# Patient Record
Sex: MALE | URBAN - METROPOLITAN AREA
[De-identification: names, ages, dates, MRNs, and addresses within clinical notes are randomized per-mention and may not be internally consistent; named-entity substitution may affect disease eponyms.]

---

## 2017-07-27 PROBLEM — Z72.0 CONTINUOUS TOBACCO ABUSE: Status: ACTIVE | Noted: 2017-07-27

## 2017-07-27 PROBLEM — K08.9 POOR DENTITION: Status: ACTIVE | Noted: 2017-07-27

## 2017-09-13 PROBLEM — Z83.79 FH: CELIAC DISEASE: Status: ACTIVE | Noted: 2017-09-13

## 2021-01-19 PROBLEM — H90.3 SENSORINEURAL HEARING LOSS (SNHL) OF BOTH EARS: Status: ACTIVE | Noted: 2021-01-19

## 2022-12-07 ENCOUNTER — APPOINTMENT (RX ONLY)
Dept: URBAN - METROPOLITAN AREA CLINIC 6 | Facility: CLINIC | Age: 62
Setting detail: DERMATOLOGY
End: 2022-12-07

## 2022-12-07 DIAGNOSIS — D485 NEOPLASM OF UNCERTAIN BEHAVIOR OF SKIN: ICD-10-CM

## 2022-12-07 DIAGNOSIS — D18.0 HEMANGIOMA: ICD-10-CM

## 2022-12-07 DIAGNOSIS — L57.0 ACTINIC KERATOSIS: ICD-10-CM

## 2022-12-07 DIAGNOSIS — Z71.89 OTHER SPECIFIED COUNSELING: ICD-10-CM

## 2022-12-07 DIAGNOSIS — D22 MELANOCYTIC NEVI: ICD-10-CM

## 2022-12-07 DIAGNOSIS — L81.4 OTHER MELANIN HYPERPIGMENTATION: ICD-10-CM

## 2022-12-07 DIAGNOSIS — L82.1 OTHER SEBORRHEIC KERATOSIS: ICD-10-CM

## 2022-12-07 PROBLEM — D48.5 NEOPLASM OF UNCERTAIN BEHAVIOR OF SKIN: Status: ACTIVE | Noted: 2022-12-07

## 2022-12-07 PROBLEM — D22.5 MELANOCYTIC NEVI OF TRUNK: Status: ACTIVE | Noted: 2022-12-07

## 2022-12-07 PROBLEM — D18.01 HEMANGIOMA OF SKIN AND SUBCUTANEOUS TISSUE: Status: ACTIVE | Noted: 2022-12-07

## 2022-12-07 PROCEDURE — ? COUNSELING

## 2022-12-07 PROCEDURE — 99203 OFFICE O/P NEW LOW 30 MIN: CPT | Mod: 25

## 2022-12-07 PROCEDURE — 11102 TANGNTL BX SKIN SINGLE LES: CPT

## 2022-12-07 PROCEDURE — ? BIOPSY BY SHAVE METHOD

## 2022-12-07 PROCEDURE — 17003 DESTRUCT PREMALG LES 2-14: CPT

## 2022-12-07 PROCEDURE — ? LIQUID NITROGEN

## 2022-12-07 PROCEDURE — 17000 DESTRUCT PREMALG LESION: CPT | Mod: 59

## 2022-12-07 PROCEDURE — 11103 TANGNTL BX SKIN EA SEP/ADDL: CPT

## 2022-12-07 ASSESSMENT — LOCATION DETAILED DESCRIPTION DERM
LOCATION DETAILED: RIGHT SUPERIOR FOREHEAD
LOCATION DETAILED: RIGHT CENTRAL TEMPLE
LOCATION DETAILED: RIGHT SUPERIOR CENTRAL MALAR CHEEK
LOCATION DETAILED: RIGHT CENTRAL MALAR CHEEK
LOCATION DETAILED: LEFT SUPERIOR CENTRAL MALAR CHEEK
LOCATION DETAILED: RIGHT SUPERIOR PARIETAL SCALP
LOCATION DETAILED: PERIUMBILICAL SKIN
LOCATION DETAILED: STERNUM
LOCATION DETAILED: EPIGASTRIC SKIN
LOCATION DETAILED: LEFT SUPERIOR HELIX
LOCATION DETAILED: RIGHT RADIAL DORSAL HAND
LOCATION DETAILED: LEFT ULNAR DORSAL HAND
LOCATION DETAILED: LEFT MEDIAL INFERIOR CHEST
LOCATION DETAILED: LEFT INFERIOR CENTRAL MALAR CHEEK

## 2022-12-07 ASSESSMENT — LOCATION SIMPLE DESCRIPTION DERM
LOCATION SIMPLE: ABDOMEN
LOCATION SIMPLE: RIGHT CHEEK
LOCATION SIMPLE: LEFT HAND
LOCATION SIMPLE: RIGHT FOREHEAD
LOCATION SIMPLE: SCALP
LOCATION SIMPLE: RIGHT TEMPLE
LOCATION SIMPLE: CHEST
LOCATION SIMPLE: LEFT CHEEK
LOCATION SIMPLE: RIGHT HAND
LOCATION SIMPLE: LEFT EAR

## 2022-12-07 ASSESSMENT — LOCATION ZONE DERM
LOCATION ZONE: HAND
LOCATION ZONE: TRUNK
LOCATION ZONE: SCALP
LOCATION ZONE: EAR
LOCATION ZONE: FACE

## 2022-12-07 NOTE — PROCEDURE: LIQUID NITROGEN
Show Applicator Variable?: Yes
Duration Of Freeze Thaw-Cycle (Seconds): 3
Application Tool (Optional): Cry-AC
Detail Level: Detailed
Consent: The patient's consent was obtained including but not limited to risks of crusting, scabbing, blistering, scarring, darker or lighter pigmentary change, recurrence, incomplete removal and infection.
Post-Care Instructions: I reviewed with the patient in detail post-care instructions. Patient is to wear sunprotection, and avoid picking at any of the treated lesions. Pt may apply Vaseline to crusted or scabbing areas.
Number Of Freeze-Thaw Cycles: 3 freeze-thaw cycles
Render Post-Care Instructions In Note?: no

## 2022-12-07 NOTE — PROCEDURE: BIOPSY BY SHAVE METHOD
Detail Level: Detailed
Depth Of Biopsy: dermis
Was A Bandage Applied: Yes
Size Of Lesion In Cm: 1.5
X Size Of Lesion In Cm: 0
Biopsy Type: H and E
Biopsy Method: Dermablade
Anesthesia Type: 1% lidocaine with epinephrine
Anesthesia Volume In Cc: 0.5
Hemostasis: Drysol
Wound Care: Petrolatum
Dressing: bandage
Destruction After The Procedure: No
Type Of Destruction Used: Curettage
Curettage Text: The wound bed was treated with curettage after the biopsy was performed.
Cryotherapy Text: The wound bed was treated with cryotherapy after the biopsy was performed.
Electrodesiccation Text: The wound bed was treated with electrodesiccation after the biopsy was performed.
Electrodesiccation And Curettage Text: The wound bed was treated with electrodesiccation and curettage after the biopsy was performed.
Silver Nitrate Text: The wound bed was treated with silver nitrate after the biopsy was performed.
Lab: [id number]
Lab Facility: 
Consent: Written consent was obtained and risks were reviewed including but not limited to scarring, infection, bleeding, scabbing, incomplete removal, nerve damage and allergy to anesthesia.
Post-Care Instructions: I reviewed with the patient in detail post-care instructions. Patient is to keep the biopsy site dry overnight, and then apply bacitracin twice daily until healed. Patient may apply hydrogen peroxide soaks to remove any crusting.
Notification Instructions: Patient will be notified of biopsy results. However, patient instructed to call the office if not contacted within 2 weeks.
Billing Type: Third-Party Bill
Information: Selecting Yes will display possible errors in your note based on the variables you have selected. This validation is only offered as a suggestion for you. PLEASE NOTE THAT THE VALIDATION TEXT WILL BE REMOVED WHEN YOU FINALIZE YOUR NOTE. IF YOU WANT TO FAX A PRELIMINARY NOTE YOU WILL NEED TO TOGGLE THIS TO 'NO' IF YOU DO NOT WANT IT IN YOUR FAXED NOTE.
Size Of Lesion In Cm: 0.4

## 2022-12-07 NOTE — PROCEDURE: MIPS QUALITY
Quality 402: Tobacco Use And Help With Quitting Among Adolescents: Patient screened for tobacco and is a smoker AND received Cessation Counseling
Quality 431: Preventive Care And Screening: Unhealthy Alcohol Use - Screening: Patient not identified as an unhealthy alcohol user when screened for unhealthy alcohol use using a systematic screening method
Detail Level: Detailed
Quality 226: Preventive Care And Screening: Tobacco Use: Screening And Cessation Intervention: Patient screened for tobacco use, is a smoker AND received Cessation Counseling within the Previous 12 Months

## 2023-10-14 ENCOUNTER — HOSPITAL ENCOUNTER (INPATIENT)
Facility: MEDICAL CENTER | Age: 63
LOS: 1 days | DRG: 064 | End: 2023-10-15
Attending: EMERGENCY MEDICINE | Admitting: STUDENT IN AN ORGANIZED HEALTH CARE EDUCATION/TRAINING PROGRAM
Payer: COMMERCIAL

## 2023-10-14 ENCOUNTER — APPOINTMENT (OUTPATIENT)
Dept: RADIOLOGY | Facility: MEDICAL CENTER | Age: 63
DRG: 064 | End: 2023-10-14
Attending: EMERGENCY MEDICINE
Payer: COMMERCIAL

## 2023-10-14 ENCOUNTER — HOSPITAL ENCOUNTER (OUTPATIENT)
Dept: RADIOLOGY | Facility: MEDICAL CENTER | Age: 63
End: 2023-10-14

## 2023-10-14 DIAGNOSIS — I60.9 SAH (SUBARACHNOID HEMORRHAGE) (HCC): ICD-10-CM

## 2023-10-14 DIAGNOSIS — I63.9 ACUTE STROKE DUE TO ISCHEMIA (HCC): Primary | ICD-10-CM

## 2023-10-14 DIAGNOSIS — I60.9 SUBARACHNOID HEMORRHAGE (HCC): ICD-10-CM

## 2023-10-14 DIAGNOSIS — F10.10 ALCOHOL USE DISORDER, MILD, ABUSE: ICD-10-CM

## 2023-10-14 DIAGNOSIS — F17.210 SMOKING GREATER THAN 30 PACK YEARS: ICD-10-CM

## 2023-10-14 PROBLEM — G93.6 CEREBRAL EDEMA (HCC): Status: ACTIVE | Noted: 2023-10-14

## 2023-10-14 PROBLEM — I63.532 ACUTE ISCHEMIC LEFT PCA STROKE (HCC): Status: ACTIVE | Noted: 2023-10-14

## 2023-10-14 LAB
ANION GAP SERPL CALC-SCNC: 12 MMOL/L (ref 7–16)
APTT PPP: 24.9 SEC (ref 24.7–36)
BASOPHILS # BLD AUTO: 1.3 % (ref 0–1.8)
BASOPHILS # BLD: 0.08 K/UL (ref 0–0.12)
BUN SERPL-MCNC: 11 MG/DL (ref 8–22)
CALCIUM SERPL-MCNC: 9.5 MG/DL (ref 8.5–10.5)
CFT BLD TEG: 5.2 MIN (ref 4.6–9.1)
CFT P HPASE BLD TEG: 5.7 MIN (ref 4.3–8.3)
CHLORIDE SERPL-SCNC: 107 MMOL/L (ref 96–112)
CLOT ANGLE BLD TEG: 71.2 DEGREES (ref 63–78)
CO2 SERPL-SCNC: 19 MMOL/L (ref 20–33)
CREAT SERPL-MCNC: 0.81 MG/DL (ref 0.5–1.4)
CT.EXTRINSIC BLD ROTEM: 1.4 MIN (ref 0.8–2.1)
EOSINOPHIL # BLD AUTO: 0.2 K/UL (ref 0–0.51)
EOSINOPHIL NFR BLD: 3.3 % (ref 0–6.9)
ERYTHROCYTE [DISTWIDTH] IN BLOOD BY AUTOMATED COUNT: 42.9 FL (ref 35.9–50)
EST. AVERAGE GLUCOSE BLD GHB EST-MCNC: 114 MG/DL
GFR SERPLBLD CREATININE-BSD FMLA CKD-EPI: 100 ML/MIN/1.73 M 2
GLUCOSE SERPL-MCNC: 95 MG/DL (ref 65–99)
HBA1C MFR BLD: 5.6 % (ref 4–5.6)
HCT VFR BLD AUTO: 48 % (ref 42–52)
HGB BLD-MCNC: 17.2 G/DL (ref 14–18)
IMM GRANULOCYTES # BLD AUTO: 0.01 K/UL (ref 0–0.11)
IMM GRANULOCYTES NFR BLD AUTO: 0.2 % (ref 0–0.9)
INR PPP: 1.06 (ref 0.87–1.13)
LYMPHOCYTES # BLD AUTO: 1.92 K/UL (ref 1–4.8)
LYMPHOCYTES NFR BLD: 31.3 % (ref 22–41)
MCF BLD TEG: 58 MM (ref 52–69)
MCF.PLATELET INHIB BLD ROTEM: 20.1 MM (ref 15–32)
MCH RBC QN AUTO: 34.7 PG (ref 27–33)
MCHC RBC AUTO-ENTMCNC: 35.8 G/DL (ref 32.3–36.5)
MCV RBC AUTO: 96.8 FL (ref 81.4–97.8)
MONOCYTES # BLD AUTO: 0.66 K/UL (ref 0–0.85)
MONOCYTES NFR BLD AUTO: 10.7 % (ref 0–13.4)
NEUTROPHILS # BLD AUTO: 3.27 K/UL (ref 1.82–7.42)
NEUTROPHILS NFR BLD: 53.2 % (ref 44–72)
NRBC # BLD AUTO: 0 K/UL
NRBC BLD-RTO: 0 /100 WBC (ref 0–0.2)
PA AA BLD-ACNC: 0 % (ref 0–11)
PA ADP BLD-ACNC: 0 % (ref 0–17)
PLATELET # BLD AUTO: 259 K/UL (ref 164–446)
PMV BLD AUTO: 9.3 FL (ref 9–12.9)
POTASSIUM SERPL-SCNC: 4.2 MMOL/L (ref 3.6–5.5)
PROTHROMBIN TIME: 13.9 SEC (ref 12–14.6)
RBC # BLD AUTO: 4.96 M/UL (ref 4.7–6.1)
SODIUM SERPL-SCNC: 138 MMOL/L (ref 135–145)
TEG ALGORITHM TGALG: NORMAL
WBC # BLD AUTO: 6.1 K/UL (ref 4.8–10.8)

## 2023-10-14 PROCEDURE — 36415 COLL VENOUS BLD VENIPUNCTURE: CPT

## 2023-10-14 PROCEDURE — 85025 COMPLETE CBC W/AUTO DIFF WBC: CPT

## 2023-10-14 PROCEDURE — 99223 1ST HOSP IP/OBS HIGH 75: CPT | Performed by: STUDENT IN AN ORGANIZED HEALTH CARE EDUCATION/TRAINING PROGRAM

## 2023-10-14 PROCEDURE — 85384 FIBRINOGEN ACTIVITY: CPT

## 2023-10-14 PROCEDURE — 85576 BLOOD PLATELET AGGREGATION: CPT | Mod: 91

## 2023-10-14 PROCEDURE — 770020 HCHG ROOM/CARE - TELE (206)

## 2023-10-14 PROCEDURE — 99406 BEHAV CHNG SMOKING 3-10 MIN: CPT

## 2023-10-14 PROCEDURE — 85347 COAGULATION TIME ACTIVATED: CPT

## 2023-10-14 PROCEDURE — 85610 PROTHROMBIN TIME: CPT

## 2023-10-14 PROCEDURE — 99222 1ST HOSP IP/OBS MODERATE 55: CPT | Performed by: PSYCHIATRY & NEUROLOGY

## 2023-10-14 PROCEDURE — 70496 CT ANGIOGRAPHY HEAD: CPT

## 2023-10-14 PROCEDURE — 80048 BASIC METABOLIC PNL TOTAL CA: CPT

## 2023-10-14 PROCEDURE — 99285 EMERGENCY DEPT VISIT HI MDM: CPT

## 2023-10-14 PROCEDURE — 83036 HEMOGLOBIN GLYCOSYLATED A1C: CPT

## 2023-10-14 PROCEDURE — 700117 HCHG RX CONTRAST REV CODE 255: Performed by: EMERGENCY MEDICINE

## 2023-10-14 PROCEDURE — 70498 CT ANGIOGRAPHY NECK: CPT

## 2023-10-14 PROCEDURE — 85730 THROMBOPLASTIN TIME PARTIAL: CPT

## 2023-10-14 RX ORDER — POLYETHYLENE GLYCOL 3350 17 G/17G
1 POWDER, FOR SOLUTION ORAL
Status: DISCONTINUED | OUTPATIENT
Start: 2023-10-14 | End: 2023-10-15 | Stop reason: HOSPADM

## 2023-10-14 RX ORDER — AMOXICILLIN 250 MG
2 CAPSULE ORAL 2 TIMES DAILY
Status: DISCONTINUED | OUTPATIENT
Start: 2023-10-14 | End: 2023-10-15 | Stop reason: HOSPADM

## 2023-10-14 RX ORDER — ACETAMINOPHEN 325 MG/1
650 TABLET ORAL EVERY 6 HOURS PRN
Status: DISCONTINUED | OUTPATIENT
Start: 2023-10-14 | End: 2023-10-15 | Stop reason: HOSPADM

## 2023-10-14 RX ORDER — BISACODYL 10 MG
10 SUPPOSITORY, RECTAL RECTAL
Status: DISCONTINUED | OUTPATIENT
Start: 2023-10-14 | End: 2023-10-15 | Stop reason: HOSPADM

## 2023-10-14 RX ADMIN — IOHEXOL 80 ML: 350 INJECTION, SOLUTION INTRAVENOUS at 16:59

## 2023-10-14 ASSESSMENT — PAIN DESCRIPTION - PAIN TYPE: TYPE: ACUTE PAIN

## 2023-10-14 ASSESSMENT — LIFESTYLE VARIABLES
TOTAL SCORE: 0
AVERAGE NUMBER OF DAYS PER WEEK YOU HAVE A DRINK CONTAINING ALCOHOL: 7
TOTAL SCORE: 0
DOES PATIENT WANT TO STOP DRINKING: NO
HOW MANY TIMES IN THE PAST YEAR HAVE YOU HAD 5 OR MORE DRINKS IN A DAY: 1
HAVE YOU EVER FELT YOU SHOULD CUT DOWN ON YOUR DRINKING: NO
EVER FELT BAD OR GUILTY ABOUT YOUR DRINKING: NO
ON A TYPICAL DAY WHEN YOU DRINK ALCOHOL HOW MANY DRINKS DO YOU HAVE: 4
CONSUMPTION TOTAL: POSITIVE
HAVE PEOPLE ANNOYED YOU BY CRITICIZING YOUR DRINKING: NO
SUBSTANCE_ABUSE: 1
ALCOHOL_USE: YES
EVER HAD A DRINK FIRST THING IN THE MORNING TO STEADY YOUR NERVES TO GET RID OF A HANGOVER: NO
TOTAL SCORE: 0

## 2023-10-14 ASSESSMENT — ENCOUNTER SYMPTOMS
NAUSEA: 0
MYALGIAS: 0
COUGH: 0
VOMITING: 0
FEVER: 0
ABDOMINAL PAIN: 0
NERVOUS/ANXIOUS: 0
SHORTNESS OF BREATH: 0
HEADACHES: 1
DIZZINESS: 0
CHILLS: 0
PALPITATIONS: 0
BLURRED VISION: 1

## 2023-10-14 ASSESSMENT — FIBROSIS 4 INDEX: FIB4 SCORE: 1.52

## 2023-10-14 NOTE — ED PROVIDER NOTES
"ED Provider Note    Scribed for Lewis Stephenson M.D. by Babita Henao. 10/14/2023  2:11 PM    Primary care provider: Shirley Hogue M.D.  Means of arrival: EMS    CHIEF COMPLAINT  Chief Complaint   Patient presents with    Sent by MD     Patient sent from [facility] for a hygroma and spontaneous SAH, patient reports feeling groggy and off on Tuesday, symptoms did not resolve, sought medical treatment today. -stroke scale,          HPI    Enid Plummer is a 62 y.o. male who presents to the Emergency Department for a hygroma with spontaneous subarachnoid hemorrhage onset prior to arrival. The patient reports he was sent from [facility] via EMS for this. The patient describes that four days ago he started to feel abnormal, and describes feeling \"groggy\", but ignored this. HE notes he was not able to remember names, or recall information easily. Wife confirmed he was not acting like his normal self. The feeling never resolved which is why he seeked medical treatment today. He notes drinking about 1 L of alcohol a day. He denies pertinent medical history. There are no known alleviating or exacerbating factors.  He denies difficulty with gait, numbness, or tingling. He rates his head pain as a 5/10.     HPI obtained directly from doctor: The patient denies head trauma. He patient denies being on blood thinners. The patient notes that he originally thought he was feeling this way because Monday night he was drinking more than normal. He notes Tuesday morning when he woke up he had abdominal pain, burry vision, nausea, and a headache, in addition to the brain fog. He then notes that with time this has improved he notes his vision is completely improved. He notes he even went back to work within the next days. Patient notes occasionally smoking marijuana. He notes a history of smoking cigarettes for 50 years. He denies any numbness, weakness, double vision, facial droop. He does note being more clumsy. He notes " his main concern right now is his mental status. He denies a history of alcohol withdrawal.    OUTSIDE HISTORIAN(S):  Wife confirmed he was not acting like his normal self.    EXTERNAL RECORDS REVIEWED  ED note from [facility] from today notes brain fog since Tuesday with no trauma. CT showed subarachnoid hemorrhage in left temporal regional with mass effect.   Office visit note December 2022 for scalp lesion.  No other past medical history.    REVIEW OF SYSTEMS  Pertinent positives include: head pain, SAH, brain fog. vision changes that has since improved  Pertinent negatives include: numbness, tingling, trouble with gait, double vision, facial droop.      PAST MEDICAL HISTORY  Patient has a reported medical history of scalp lesion in December 2022, no other pertinent medical history.     FAMILY HISTORY  Family History   Problem Relation Age of Onset    Parkinson's Disease Mother     Diabetes Daughter        SOCIAL HISTORY  Social History     Tobacco Use    Smoking status: Every Day     Current packs/day: 0.50     Average packs/day: 0.5 packs/day for 35.0 years (17.5 ttl pk-yrs)     Types: Cigarettes    Smokeless tobacco: Never   Vaping Use    Vaping Use: Never used   Substance Use Topics    Alcohol use: Yes     Alcohol/week: 10.5 oz     Types: 21 Standard drinks or equivalent per week    Drug use: No     Social History     Substance and Sexual Activity   Drug Use No       SURGICAL HISTORY  Past Surgical History:   Procedure Laterality Date    COLONOSCOPY - ENDO  10/31/2017    Procedure: COLONOSCOPY - ENDO;  Surgeon: Erasto Romano M.D.;  Location: [facility];  Service: Gastroenterology    SHOULDER ARTHROSCOPY W/ SLAP / LABRAL REPAIR  5/14/2013    Performed by Rodolfo Srivastava M.D. at SURGERY [facility]    OTHER ORTHOPEDIC SURGERY         CURRENT MEDICATIONS  No current facility-administered medications for this encounter.  No current outpatient medications     ALLERGIES  Allergies   Allergen  "Reactions    Augmentin Rash       PHYSICAL EXAM  VITAL SIGNS: BP (!) 142/64   Pulse 63   Temp 37.1 °C (98.8 °F) (Temporal)   Resp 20   Ht 1.727 m (5' 8\")   Wt 81.6 kg (180 lb)   SpO2 95%   BMI 27.37 kg/m²   Reviewed and mildly elevated blood pressure  Constitutional: Well developed, Well nourished.  HENT: Normocephalic, atraumatic, bilateral external ears normal, No intraoral erythema, edema, exudate  Eyes: PERRLA, conjunctiva pink, no scleral icterus.   Cardiovascular: Regular rate and rhythm. No murmurs, rubs or gallops.  No dependent edema or calf tenderness  Respiratory: Lungs clear to auscultation bilaterally. No wheezes, rales, or rhonchi.  Abdominal:  Abdomen soft, non-tender, non distended. No rebound, or guarding.    Skin: No erythema, no rash. No wounds or bruising.  Genitourinary: No costovertebral angle tenderness.   Musculoskeletal: no deformities.   Neurologic: LOC: Normal.      Motor left leg: No drift.  LOC questions; answers correctly.     Motor right leg: No drift.  Commands: Follows.     Limb ataxia: None.  Best gaze: Normal.      Sensation: Intact to light touch 4 limbs.  Visual fields: Intact by quadrants.     Language: Fluent.  Facial palsy: None.     Dysarthria: Normal.  Motor left arm: No drift.     Extinction: Normal.  Motor right arm: No drift.     Score: 0.    Psychiatric: Affect normal, Judgment normal, Mood normal.     LABS Ordered and Reviewed by Me:  Results for orders placed or performed during the hospital encounter of 10/14/23   CBC WITH DIFFERENTIAL   Result Value Ref Range    WBC 6.1 4.8 - 10.8 K/uL    RBC 4.96 4.70 - 6.10 M/uL    Hemoglobin 17.2 14.0 - 18.0 g/dL    Hematocrit 48.0 42.0 - 52.0 %    MCV 96.8 81.4 - 97.8 fL    MCH 34.7 (H) 27.0 - 33.0 pg    MCHC 35.8 32.3 - 36.5 g/dL    RDW 42.9 35.9 - 50.0 fL    Platelet Count 259 164 - 446 K/uL    MPV 9.3 9.0 - 12.9 fL    Neutrophils-Polys 53.20 44.00 - 72.00 %    Lymphocytes 31.30 22.00 - 41.00 %    Monocytes 10.70 0.00 - " 13.40 %    Eosinophils 3.30 0.00 - 6.90 %    Basophils 1.30 0.00 - 1.80 %    Immature Granulocytes 0.20 0.00 - 0.90 %    Nucleated RBC 0.00 0.00 - 0.20 /100 WBC    Neutrophils (Absolute) 3.27 1.82 - 7.42 K/uL    Lymphs (Absolute) 1.92 1.00 - 4.80 K/uL    Monos (Absolute) 0.66 0.00 - 0.85 K/uL    Eos (Absolute) 0.20 0.00 - 0.51 K/uL    Baso (Absolute) 0.08 0.00 - 0.12 K/uL    Immature Granulocytes (abs) 0.01 0.00 - 0.11 K/uL    NRBC (Absolute) 0.00 K/uL   Basic Metabolic Panel   Result Value Ref Range    Sodium 138 135 - 145 mmol/L    Potassium 4.2 3.6 - 5.5 mmol/L    Chloride 107 96 - 112 mmol/L    Co2 19 (L) 20 - 33 mmol/L    Glucose 95 65 - 99 mg/dL    Bun 11 8 - 22 mg/dL    Creatinine 0.81 0.50 - 1.40 mg/dL    Calcium 9.5 8.5 - 10.5 mg/dL    Anion Gap 12.0 7.0 - 16.0   APTT   Result Value Ref Range    APTT 24.9 24.7 - 36.0 sec   Prothrombin Time   Result Value Ref Range    PT 13.9 12.0 - 14.6 sec    INR 1.06 0.87 - 1.13   PLATELET MAPPING WITH BASIC TEG   Result Value Ref Range    Reaction Time Initial-R 5.2 4.6 - 9.1 min    React Time Initial Hep 5.7 4.3 - 8.3 min    Clot Kinetics-K 1.4 0.8 - 2.1 min    Clot Angle-Angle 71.2 63.0 - 78.0 degrees    Maximum Clot Strength-MA 58.0 52.0 - 69.0 mm    TEG Functional Fibrinogen(MA) 20.1 15.0 - 32.0 mm    % Inhibition ADP 0.0 0.0 - 17.0 %    % Inhibition AA 0.0 0.0 - 11.0 %    TEG Algorithm Link Algorithm    ESTIMATED GFR   Result Value Ref Range    GFR (CKD-EPI) 100 >60 mL/min/1.73 m 2     ED COURSE:  2:11 PM - Patient seen and examined at bedside. Patient is a 62 y.o. male who presents for evaluation of spontaneous SAH associated with brain fog. Exam reveals Alert & oriented x 2, cranial nerves 2-12 intact . Strength equal in extremities. Able to Finger to nose. Denies numbness or tingling. No focal deficit noted. Discussed with patient and/or family that he will be admitted for further plan of care and laboratory studies will be repeated. CBC w/ diff, BMP, APTT,  prothrombin time, platelet mapping ordered. Patient and/or family agrees to the plan of care.       INTERVENTIONS BY ME:    2:33 PM Paged Neurosurgery.      2:49 PM Paged intensivist.     I have discussed management of the patient with the following physicians and sources:    2:33 PM I discussed the patient's case and the above findings with Dr. Almeida (Neurosurgery) who would like a CT and CTA of the head. Ordered CT-Head and CT-CTA head at this time.     2:49 PM I discussed the patient's case and the above findings with Dr. Almeida (Neurosurgery) who notes Dr. Villarreal (Neurology) knows about the patient and would like me to consult him. Paged Dr. Villarreal.     2:56 PM I discussed the patient's case and the above findings with Dr. Colvin (Pulmonary Medicine) who will be admitting the patient and agrees to consult Dr. Villarreal.     3:02 PM I discussed the patient's case and the above findings with Dr. Villarreal (Neurologist) who CTA head and neck and admit to hospitlaist floor. Plan to admit for stroke work up, MRI. Ordered CT-CTA neck. Informed Dr. Colvin of this plan.    3:40 PM I discussed the patient's case and the above findings with Dr. Rubalcava (Internal Medicine) who will admit the patient.     MEDICAL DECISION MAKING:  PROBLEMS EVALUATED THIS VISIT:    Subarachnoid hemorrhage    Patient transferred from [place] with evidence of SAH on CT head. Only complaints is fogginess and decreased mental acuity including forgetting names. No focal deficits on exam. Blood pressure well controlled at blood pressure 140/60s. Was admitted for further evaluation by neurology and neurosurgery including MRI brain, CTA head and neck. Admitted to medicine service.    RISK:       PLAN:    Patient will be admitted to Dr. Rubalcava (Internal Medicine)  in guarded condition for CTA head and neck MRI brain PT and OT eval and neuros consultation.    CONDITION: fine.     FINAL IMPRESSION  1. SAH (subarachnoid hemorrhage) (HCC)        Babita GUPTA (Scribe),  am scribing for, and in the presence of, Lewis Stephenson M.D..    Electronically signed by: Babita Henao (Scribe), 10/14/2023    Sally Haji D.O., PGY-2 internal medicine consult, 10/14/2023 3:24 PM    I, Lewis Stephenson M.D. personally performed the services described in this documentation, as scribed by Babita Henao in my presence, and it is both accurate and complete.    I independently evaluated the patient and repeated the important components of the history and physical. I discussed the management with the resident. I have reviewed and agree with the pertinent clinical information above including history, exam, study findings, and recommendations.  I discussed management with resident Dr. Haji.  This patient is a transfer for subarachnoid hemorrhage but more likely is a stroke with some hemorrhagic conversion.  He will require further stroke work-up.  There is no history of trauma.  Fortunately his NIH score is currently 0.    Electronically signed by: Lewis Stephenson M.D., 10/14/2023 6:08 PM       The note accurately reflects work and decisions made by me.  Lewis Stephenson M.D.  10/14/2023  6:08 PM

## 2023-10-14 NOTE — ED NOTES
Med rec updated and complete. Allergies reviewed.   Confirmed name and date of birth.  Pt DENIES taking any medications.    Home pharmacy  [facility] = [phone]

## 2023-10-14 NOTE — PROGRESS NOTES
Dr Harris ER called for recommendations  Will defer to Dr Villarreal with neurology who accepted patient in transfer

## 2023-10-14 NOTE — ED TRIAGE NOTES
"Chief Complaint   Patient presents with    Sent by MD     Patient sent from [facility] for a hygroma and spontaneous SAH, patient reports feeling groggy and off on Tuesday, symptoms did not resolve, sought medical treatment today. -stroke scale,      BP (!) 142/64   Pulse 63   Temp 37.1 °C (98.8 °F) (Temporal)   Resp 20   Ht 1.727 m (5' 8\")   Wt 81.6 kg (180 lb)   SpO2 95%     "

## 2023-10-14 NOTE — ED NOTES
Bedside report given to: Hortensia RN, removed self from care of patient.  POC discussed with patient. Call light within reach, all needs addressed at this time.       Fall risk interventions in place: Move the patient closer to the nurse's station, Patient's personal possessions are with in their safe reach, Place socks on patient, Place fall risk sign on patient's door, Give patient urinal if applicable, Keep floor surfaces clean and dry, and Accompanied to restroom (all applicable per [place] Fall risk assessment)   Continuous monitoring: Cardiac Leads, Pulse Ox, or Blood Pressure  IVF/IV medications: Not Applicable   Oxygen: Room Air  Bedside sitter: Not Applicable   Isolation: Not Applicable

## 2023-10-14 NOTE — ED NOTES
Patient changed into hospital gown, secondary piv access established, blood drawn and sent to lab, placed on continuous cardiac monitoring, patient repetitive in conversation, disoriented to time, responds October 2021 when asked month and year, patient either unwilling to say or does not know current president. Bilateral  strengths equal, no slurred speech or facial droop.

## 2023-10-14 NOTE — H&P
Hospital Medicine History & Physical Note    Date of Service  10/14/2023    Primary Care Physician  Shirley Hogue M.D.    Consultants  neurology    Specialist Names: Dr. Villarreal    Code Status  Full Code    Chief Complaint  Chief Complaint   Patient presents with    Sent by MD     Patient sent from [facility] for a hygroma and spontaneous SAH, patient reports feeling groggy and off on Tuesday, symptoms did not resolve, sought medical treatment today. -stroke scale,        History of Presenting Illness  Enid Plummer is a 62 y.o. male who presented 10/14/2023 with brain fogginess.  This is a pleasant gentleman with a long history of smoking, who was transferred from [facility] for stroke evaluation.  He presented there with complaints of brain fogginess and headaches as well as some vision changes in his right eye that started approximately 4 days ago.  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  He does drink alcohol on a regular basis shots of bourbon.  He describes a sudden onset of the headache associated with nausea as well as up to stomach.  Evaluation at the outside hospital with a head CT was concerning for a hygroma with spontaneous subarachnoid hemorrhage.  However, per the neurologist read here, was concerning for a left PCA stroke involving the left medial temporal lobe.  Concern for mild hemorrhagic transformation.  Patient reports that he does not take any medications.    I discussed the plan of care with patient, family, and ER physician, Dr. Stephenson .  I discussed the case with the neurologist, Dr. Villarreal.    Review of Systems  Review of Systems   Constitutional:  Negative for chills and fever.   Eyes:  Positive for blurred vision.   Respiratory:  Negative for cough and shortness of breath.    Cardiovascular:  Negative for chest pain and palpitations.   Gastrointestinal:  Negative for abdominal pain, nausea and vomiting.   Genitourinary:  Negative for  dysuria and hematuria.   Musculoskeletal:  Negative for joint pain and myalgias.   Neurological:  Positive for headaches. Negative for dizziness.   Psychiatric/Behavioral:  Positive for substance abuse (Marijuana and alcohol). The patient is not nervous/anxious.        Past Medical History   has a past medical history of Acute stroke due to ischemia (HCC) (10/14/2023), Continuous tobacco abuse (7/27/2017), FH: celiac disease (9/13/2017), and Poor dentition (7/27/2017).    Surgical History   has a past surgical history that includes other orthopedic surgery; shoulder arthroscopy w/ slap / labral repair (5/14/2013); and colonoscopy - endo (10/31/2017).     Family History  family history includes Diabetes in his daughter; Parkinson's Disease in his mother.  Father had stroke.  Family history reviewed with patient. There is family history that is pertinent to the chief complaint.     Social History   reports that he has been smoking cigarettes. He has a 17.5 pack-year smoking history. He has never used smokeless tobacco. He reports current alcohol use of about 10.5 oz of alcohol per week. He reports that he does not use drugs.    Allergies  Allergies   Allergen Reactions    Amoxicillin-Pot Clavulanate Rash     > 20 years ago       Medications  None       Physical Exam  Temp:  [36.9 °C (98.4 °F)-37.1 °C (98.8 °F)] 37.1 °C (98.8 °F)  Pulse:  [56-85] 63  Resp:  [16-20] 20  BP: (125-142)/(64-89) 142/64  SpO2:  [93 %-96 %] 95 %  Blood Pressure: (Abnormal) 142/64   Temperature: 37.1 °C (98.8 °F)   Pulse: 63   Respiration: 20   Pulse Oximetry: 95 %       Physical Exam  Vitals and nursing note reviewed.   Constitutional:       General: He is not in acute distress.     Appearance: Normal appearance. He is well-developed. He is not diaphoretic.   HENT:      Head: Normocephalic and atraumatic.      Right Ear: External ear normal.      Left Ear: External ear normal.      Nose: Nose normal.      Mouth/Throat:      Pharynx:  Oropharynx is clear. No oropharyngeal exudate or posterior oropharyngeal erythema.   Eyes:      General: No scleral icterus.        Right eye: No discharge.         Left eye: No discharge.      Conjunctiva/sclera: Conjunctivae normal.      Pupils: Pupils are equal, round, and reactive to light.   Neck:      Thyroid: No thyromegaly.      Vascular: No JVD.      Trachea: No tracheal deviation.   Cardiovascular:      Rate and Rhythm: Normal rate and regular rhythm.      Heart sounds: Normal heart sounds. No murmur heard.     No friction rub. No gallop.   Pulmonary:      Effort: Pulmonary effort is normal. No respiratory distress.      Breath sounds: Normal breath sounds. No stridor. No wheezing or rales.   Abdominal:      General: Bowel sounds are normal. There is no distension.      Palpations: Abdomen is soft. There is no mass.      Tenderness: There is no abdominal tenderness. There is no guarding or rebound.   Musculoskeletal:         General: No tenderness or deformity.      Cervical back: Neck supple. No tenderness.      Right lower leg: No edema.      Left lower leg: No edema.   Lymphadenopathy:      Cervical: No cervical adenopathy.   Skin:     General: Skin is warm and dry.      Coloration: Skin is not pale.      Findings: No erythema or rash.   Neurological:      Mental Status: He is alert and oriented to person, place, and time.      Sensory: No sensory deficit.      Motor: No weakness or abnormal muscle tone.      Comments: No peritoneal drift.  Motor strength full in upper and lower extremities.  Cranial nerves II to XII are intact.  Vision full to confrontation.   Psychiatric:         Behavior: Behavior normal.         Thought Content: Thought content normal.         Judgment: Judgment normal.         Laboratory:  Recent Labs     10/14/23  1050 10/14/23  1407   WBC 5.3 6.1   RBC 4.85 4.96   HEMOGLOBIN 16.7 17.2   HEMATOCRIT 47.5 48.0   MCV 97.9* 96.8   MCH 34.4* 34.7*   MCHC 35.2 35.8   RDW 12.0 42.9  "  PLATELETCT 249 259   MPV 9.2 9.3     Recent Labs     10/14/23  1050 10/14/23  1407   SODIUM 139 138   POTASSIUM 4.2 4.2   CHLORIDE 111* 107   CO2 17* 19*   GLUCOSE 109* 95   BUN 13 11   CREATININE 0.8 0.81   CALCIUM 9.3 9.5     Recent Labs     10/14/23  1050 10/14/23  1407   ALTSGPT 32  --    ASTSGOT 35  --    ALKPHOSPHAT 55  --    TBILIRUBIN 1.2  --    GLUCOSE 109* 95     Recent Labs     10/14/23  1407   APTT 24.9   INR 1.06     No results for input(s): \"NTPROBNP\" in the last 72 hours.      No results for input(s): \"TROPONINT\" in the last 72 hours.    Imaging:  CT-CTA NECK WITH & W/O-POST PROCESSING   Final Result      No high-grade stenosis, large vessel occlusion, aneurysm or dissection.      CT-CTA HEAD WITH & W/O-POST PROCESS   Final Result      1.  Findings consistent with acute on chronic LEFT temporal occipital infarct with hemorrhagic transformation.  Small amount of subarachnoid hemorrhage within LEFT occipital sulci as seen on outside CT..   2.  Chronic LEFT thalamic infarct.   3.  No intracranial aneurysm, focal stenosis or abrupt large vessel cut off.      MR-BRAIN-W/O    (Results Pending)   EC-ECHOCARDIOGRAM COMPLETE W/O CONT    (Results Pending)     CT per my read shows a hypodensity within the left temporal occipital lobe.      CT angiogram of the head and neck per my read shows no large vessel occlusion.  No vascular malformations.      Assessment/Plan:  Justification for Admission Status  I anticipate this patient will require at least two midnights for appropriate medical management, necessitating inpatient admission because patient presents with findings on CT scan concerning for stroke.  He will need continuous cardiac monitoring with MRI as well as an echocardiogram for further work-up.    Patient will need a Telemetry bed on NEUROLOGY service .  The need is secondary to subacute stroke.    * Acute ischemic left PCA stroke (HCC)- (present on admission)  Assessment & Plan  Per CT angiogram " acute on chronic with subarachnoid hemorrhage.    Admit to neurology floor inpatient status  Neurochecks every 4 hours  Avoid antiplatelet therapy for now  Continuous cardiac monitoring  Echocardiogram to assess for PFO and intraventricular thrombus  MRI of the brain without contrast    Discussed with Dr. Villarreal, neurology    Subarachnoid hemorrhage (HCC)- (present on admission)  Assessment & Plan  Mild subarachnoid hemorrhage likely due to hemorrhagic transformation of acute on chronic left PCA stroke.    Avoid anticoagulation or antiplatelet therapy for now  MRI of the brain pending  Neurochecks every 4 hours    Alcohol use disorder, mild, abuse- (present on admission)  Assessment & Plan  Counseled decreasing alcohol intake.  No history of alcohol withdrawal.    Cerebral edema (HCC)- (present on admission)  Assessment & Plan  Due to acute on chronic stroke.    Neurochecks every 4 hours  Monitor closely  MRI of the brain without contrast    Smoking greater than 30 pack years- (present on admission)  Assessment & Plan  Continues to smoke half a pack per day.    I counseled the patient for 4 minutes regarding smoking cessation using methods such as nicotine replacement therapy with patches and gum and medications such as Wellbutrin.  I also discussed with him breathing techniques and meditation as well as regular physical activity, which will assist him with smoking cessation.  Patient Is not interested in quitting at this time.            VTE prophylaxis: SCDs/TEDs and pharmacologic prophylaxis contraindicated due to hemorrhagic transformation on CT

## 2023-10-15 ENCOUNTER — APPOINTMENT (OUTPATIENT)
Dept: CARDIOLOGY | Facility: MEDICAL CENTER | Age: 63
DRG: 064 | End: 2023-10-15
Attending: STUDENT IN AN ORGANIZED HEALTH CARE EDUCATION/TRAINING PROGRAM
Payer: COMMERCIAL

## 2023-10-15 ENCOUNTER — PHARMACY VISIT (OUTPATIENT)
Dept: PHARMACY | Facility: MEDICAL CENTER | Age: 63
End: 2023-10-15
Payer: COMMERCIAL

## 2023-10-15 ENCOUNTER — APPOINTMENT (OUTPATIENT)
Dept: RADIOLOGY | Facility: MEDICAL CENTER | Age: 63
DRG: 064 | End: 2023-10-15
Attending: STUDENT IN AN ORGANIZED HEALTH CARE EDUCATION/TRAINING PROGRAM
Payer: COMMERCIAL

## 2023-10-15 VITALS
OXYGEN SATURATION: 98 % | DIASTOLIC BLOOD PRESSURE: 67 MMHG | HEIGHT: 68 IN | TEMPERATURE: 97.7 F | SYSTOLIC BLOOD PRESSURE: 117 MMHG | BODY MASS INDEX: 27 KG/M2 | RESPIRATION RATE: 17 BRPM | WEIGHT: 178.13 LBS | HEART RATE: 60 BPM

## 2023-10-15 LAB
ALBUMIN SERPL BCP-MCNC: 3.8 G/DL (ref 3.2–4.9)
ALBUMIN/GLOB SERPL: 1.7 G/DL
ALP SERPL-CCNC: 55 U/L (ref 30–99)
ALT SERPL-CCNC: 19 U/L (ref 2–50)
ANION GAP SERPL CALC-SCNC: 10 MMOL/L (ref 7–16)
AST SERPL-CCNC: 16 U/L (ref 12–45)
BASOPHILS # BLD AUTO: 1.5 % (ref 0–1.8)
BASOPHILS # BLD: 0.1 K/UL (ref 0–0.12)
BILIRUB SERPL-MCNC: 0.5 MG/DL (ref 0.1–1.5)
BUN SERPL-MCNC: 17 MG/DL (ref 8–22)
CALCIUM ALBUM COR SERPL-MCNC: 9.3 MG/DL (ref 8.5–10.5)
CALCIUM SERPL-MCNC: 9.1 MG/DL (ref 8.5–10.5)
CHLORIDE SERPL-SCNC: 105 MMOL/L (ref 96–112)
CHOLEST SERPL-MCNC: 194 MG/DL (ref 100–199)
CO2 SERPL-SCNC: 22 MMOL/L (ref 20–33)
CREAT SERPL-MCNC: 0.98 MG/DL (ref 0.5–1.4)
EOSINOPHIL # BLD AUTO: 0.35 K/UL (ref 0–0.51)
EOSINOPHIL NFR BLD: 5.3 % (ref 0–6.9)
ERYTHROCYTE [DISTWIDTH] IN BLOOD BY AUTOMATED COUNT: 44 FL (ref 35.9–50)
GFR SERPLBLD CREATININE-BSD FMLA CKD-EPI: 87 ML/MIN/1.73 M 2
GLOBULIN SER CALC-MCNC: 2.2 G/DL (ref 1.9–3.5)
GLUCOSE SERPL-MCNC: 116 MG/DL (ref 65–99)
HCT VFR BLD AUTO: 43.6 % (ref 42–52)
HDLC SERPL-MCNC: 44 MG/DL
HGB BLD-MCNC: 15.1 G/DL (ref 14–18)
IMM GRANULOCYTES # BLD AUTO: 0.02 K/UL (ref 0–0.11)
IMM GRANULOCYTES NFR BLD AUTO: 0.3 % (ref 0–0.9)
LDLC SERPL CALC-MCNC: 131 MG/DL
LV EJECT FRACT  99904: 60
LYMPHOCYTES # BLD AUTO: 2.08 K/UL (ref 1–4.8)
LYMPHOCYTES NFR BLD: 31.4 % (ref 22–41)
MAGNESIUM SERPL-MCNC: 2.2 MG/DL (ref 1.5–2.5)
MCH RBC QN AUTO: 34.2 PG (ref 27–33)
MCHC RBC AUTO-ENTMCNC: 34.6 G/DL (ref 32.3–36.5)
MCV RBC AUTO: 98.9 FL (ref 81.4–97.8)
MONOCYTES # BLD AUTO: 0.74 K/UL (ref 0–0.85)
MONOCYTES NFR BLD AUTO: 11.2 % (ref 0–13.4)
NEUTROPHILS # BLD AUTO: 3.33 K/UL (ref 1.82–7.42)
NEUTROPHILS NFR BLD: 50.3 % (ref 44–72)
NRBC # BLD AUTO: 0 K/UL
NRBC BLD-RTO: 0 /100 WBC (ref 0–0.2)
PLATELET # BLD AUTO: 239 K/UL (ref 164–446)
PMV BLD AUTO: 9.5 FL (ref 9–12.9)
POTASSIUM SERPL-SCNC: 4.2 MMOL/L (ref 3.6–5.5)
PROT SERPL-MCNC: 6 G/DL (ref 6–8.2)
RBC # BLD AUTO: 4.41 M/UL (ref 4.7–6.1)
SODIUM SERPL-SCNC: 137 MMOL/L (ref 135–145)
TRIGL SERPL-MCNC: 94 MG/DL (ref 0–149)
WBC # BLD AUTO: 6.6 K/UL (ref 4.8–10.8)

## 2023-10-15 PROCEDURE — RXMED WILLOW AMBULATORY MEDICATION CHARGE

## 2023-10-15 PROCEDURE — 92523 SPEECH SOUND LANG COMPREHEN: CPT

## 2023-10-15 PROCEDURE — 80061 LIPID PANEL: CPT

## 2023-10-15 PROCEDURE — 99239 HOSP IP/OBS DSCHRG MGMT >30: CPT | Mod: GC | Performed by: STUDENT IN AN ORGANIZED HEALTH CARE EDUCATION/TRAINING PROGRAM

## 2023-10-15 PROCEDURE — 92610 EVALUATE SWALLOWING FUNCTION: CPT

## 2023-10-15 PROCEDURE — 70551 MRI BRAIN STEM W/O DYE: CPT

## 2023-10-15 PROCEDURE — 80053 COMPREHEN METABOLIC PANEL: CPT

## 2023-10-15 PROCEDURE — 85025 COMPLETE CBC W/AUTO DIFF WBC: CPT

## 2023-10-15 PROCEDURE — 700102 HCHG RX REV CODE 250 W/ 637 OVERRIDE(OP)

## 2023-10-15 PROCEDURE — 93306 TTE W/DOPPLER COMPLETE: CPT

## 2023-10-15 PROCEDURE — 93306 TTE W/DOPPLER COMPLETE: CPT | Mod: 26 | Performed by: INTERNAL MEDICINE

## 2023-10-15 PROCEDURE — A9270 NON-COVERED ITEM OR SERVICE: HCPCS

## 2023-10-15 PROCEDURE — 36415 COLL VENOUS BLD VENIPUNCTURE: CPT

## 2023-10-15 PROCEDURE — 99233 SBSQ HOSP IP/OBS HIGH 50: CPT | Performed by: PSYCHIATRY & NEUROLOGY

## 2023-10-15 PROCEDURE — 83735 ASSAY OF MAGNESIUM: CPT

## 2023-10-15 PROCEDURE — 97162 PT EVAL MOD COMPLEX 30 MIN: CPT

## 2023-10-15 RX ORDER — ASPIRIN 81 MG/1
81 TABLET ORAL DAILY
Status: DISCONTINUED | OUTPATIENT
Start: 2023-10-15 | End: 2023-10-15 | Stop reason: HOSPADM

## 2023-10-15 RX ORDER — ASPIRIN 81 MG/1
81 TABLET ORAL DAILY
Qty: 90 TABLET | Refills: 0 | Status: SHIPPED | OUTPATIENT
Start: 2023-10-15 | End: 2024-01-13

## 2023-10-15 RX ORDER — ATORVASTATIN CALCIUM 80 MG/1
80 TABLET, FILM COATED ORAL DAILY
Qty: 90 TABLET | Refills: 0 | Status: SHIPPED | OUTPATIENT
Start: 2023-10-16 | End: 2023-11-07 | Stop reason: SDUPTHER

## 2023-10-15 RX ORDER — ATORVASTATIN CALCIUM 80 MG/1
80 TABLET, FILM COATED ORAL DAILY
Status: DISCONTINUED | OUTPATIENT
Start: 2023-10-16 | End: 2023-10-15 | Stop reason: HOSPADM

## 2023-10-15 RX ORDER — ATORVASTATIN CALCIUM 40 MG/1
40 TABLET, FILM COATED ORAL DAILY
Qty: 30 TABLET | Refills: 0 | Status: SHIPPED | OUTPATIENT
Start: 2023-10-15 | End: 2023-10-15

## 2023-10-15 RX ORDER — ATORVASTATIN CALCIUM 40 MG/1
40 TABLET, FILM COATED ORAL DAILY
Status: DISCONTINUED | OUTPATIENT
Start: 2023-10-15 | End: 2023-10-15

## 2023-10-15 RX ADMIN — ATORVASTATIN CALCIUM 40 MG: 40 TABLET, FILM COATED ORAL at 09:23

## 2023-10-15 ASSESSMENT — FIBROSIS 4 INDEX: FIB4 SCORE: 0.94

## 2023-10-15 ASSESSMENT — COGNITIVE AND FUNCTIONAL STATUS - GENERAL
SUGGESTED CMS G CODE MODIFIER MOBILITY: CH
MOBILITY SCORE: 24

## 2023-10-15 ASSESSMENT — GAIT ASSESSMENTS
GAIT LEVEL OF ASSIST: SUPERVISED
DISTANCE (FEET): 200
DEVIATION: NO DEVIATION

## 2023-10-15 ASSESSMENT — PAIN DESCRIPTION - PAIN TYPE: TYPE: ACUTE PAIN

## 2023-10-15 NOTE — ASSESSMENT & PLAN NOTE
Per CT angiogram acute on chronic with subarachnoid hemorrhage.    Admit to neurology floor inpatient status  Neurochecks every 4 hours  Avoid antiplatelet therapy for now  Continuous cardiac monitoring  Echocardiogram to assess for PFO and intraventricular thrombus  MRI of the brain without contrast    Discussed with Dr. Villarreal, neurology

## 2023-10-15 NOTE — THERAPY
Physical Therapy   Initial Evaluation     Patient Name: Enid Plummer  Age:  62 y.o., Sex:  male  Medical Record #: [id number]  Today's Date: 10/15/2023     Precautions  Precautions: Fall Risk;Swallow Precautions;Other (See Comments)  Comments: BP goal: 110-130/60-80    Assessment  Patient is 62 y.o. male who presented 10/14/2023 with brain fogginess, headaches, vision changes in his right eye that started approximately 4 days ago. Was transferred from  for stroke evaluation.    Found to have acute ischemic L PCA stroke, SAH, cerebral edema  PMH: Continuous tobacco abuse, alcohol use disorder.    Patient seen for PT evaluation. In bed, agreeable to participate. Was able to demonstrate functional mobility tasks as detailed below. Patient appears to be close to his baseline in terms of functional mobility. Does not need any further PT services at this time.   Reports brain fog still persistent.     Recommend OOB to chair for meals and ambulate in the hallway multiple times during the day, with supervision from nursing/family.     Plan    Physical Therapy Initial Treatment Plan   Duration: Evaluation only    DC Equipment Recommendations: None  Discharge Recommendations: Anticipate that the patient will have no further physical therapy needs after discharge from the hospital     Objective       10/15/23 1026   Charge Group   PT Evaluation PT Evaluation Mod   Total Time Spent   PT Evaluation Time Spent (Mins) 20   PT Total Time Spent (Calculated) 20   Initial Contact Note    Initial Contact Note Order Received and Verified, Physical Therapy Evaluation in Progress with Full Report to Follow.   Precautions   Precautions Fall Risk;Swallow Precautions;Other (See Comments)   Comments BP goal: 110-130/60-80   Vitals   Pulse 60   Patient BP Position Sitting  (Edge of bed, post activity)   Blood Pressure 117/67   Pulse Oximetry 98 %   O2 Delivery Device None - Room Air   Pain   Pain Scales 0 to 10 Scale     Intervention Declines   Pain 0 - 10 Group   Therapist Pain Assessment Prior to Activity;During Activity;Post Activity;0   Prior Living Situation   Prior Services Home-Independent   Housing / Facility 1 Story House   Steps Into Home 15   Steps In Home 2   Rail None   Equipment Owned None   Lives with - Patient's Self Care Capacity Spouse   Comments Patient's wife will be able to assist if needed   Prior Level of Functional Mobility   Bed Mobility Independent   Transfer Status Independent   Ambulation Independent   Ambulation Distance Community   Assistive Devices Used None   Stairs Independent   Comments 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    History of Falls   History of Falls No   Cognition    Level of Consciousness Alert   Passive ROM Upper Body   Comments Please refer to OT notes for upper body assessment   Passive ROM Lower Body   Passive ROM Lower Body WDL   Active ROM Lower Body    Active ROM Lower Body  WDL   Strength Lower Body   Lower Body Strength  WDL   Sensation Lower Body   Lower Extremity Sensation   WDL   Lower Body Muscle Tone   Lower Body Muscle Tone  WDL   Coordination Lower Body    Coordination Lower Body  WDL   Vision   Vision Comments Patient reports occasional blurred vision, blind spot (+) R upper quadrant-follows up with eye doctor; Smooth pursuit & peripheral vision-WNL   Other Treatments   Other Treatments Provided Discussed stroke symptoms, risk factors (smoking, alcohol &elevated cholesterol?), importance to control the risk factors, following with PCP, importance of calling 911 sooner than later if stroke symptoms re-occur.   Balance Assessment   Sitting Balance (Static) Normal   Sitting Balance (Dynamic) Normal   Standing Balance (Static) Good   Standing Balance (Dynamic) Fair +   Comments Patient able to perform single leg stance ~ 30s B/L, mini squat, without loss of balance   Bed Mobility    Supine to Sit Independent   Scooting Independent   Comments HOB flat   Gait  Analysis   Gait Level Of Assist Supervised   Assistive Device None   Distance (Feet) 200   Deviation No deviation   # of Stairs Climbed   (Patient able to perform single leg stance and standing high rise marches without loss of balance. Verbalizes no concerns with stairs at this time.)   Comments Ambulating in the room ad ambar and able to ambulate in the hallway during PT session without loss of balance.   Functional Mobility   Sit to Stand Independent   Bed, Chair, Wheelchair Transfer Refused   How much difficulty does the patient currently have...   Turning over in bed (including adjusting bedclothes, sheets and blankets)? 4   Sitting down on and standing up from a chair with arms (e.g., wheelchair, bedside commode, etc.) 4   Moving from lying on back to sitting on the side of the bed? 4   How much help from another person does the patient currently need...   Moving to and from a bed to a chair (including a wheelchair)? 4   Need to walk in a hospital room? 4   Climbing 3-5 steps with a railing? 4   6 clicks Mobility Score 24   Activity Tolerance   Sitting Edge of Bed Post session   Patient / Family Goals    Patient / Family Goal #1 To return home   Education Group   Education Provided Role of Physical Therapist;Cerebral Vascular Accident   Role of Physical Therapist Patient Response Patient;Eager;Acceptance;Explanation;Demonstration;Verbal Demonstration;Action Demonstration   CVA Patient Response Patient;Eager;Acceptance;Explanation   Physical Therapy Initial Treatment Plan    Duration Evaluation only   Anticipated Discharge Equipment and Recommendations   DC Equipment Recommendations None   Discharge Recommendations Anticipate that the patient will have no further physical therapy needs after discharge from the hospital   Interdisciplinary Plan of Care Collaboration   IDT Collaboration with  Nursing   Patient Position at End of Therapy Seated;Edge of Bed;Call Light within Reach;Tray Table within Reach   Session  Information   Date / Session Number  10/15-Eval only

## 2023-10-15 NOTE — PROGRESS NOTES
"Neurology Progress Note  Neurohospitalist Service, [facility]    Referring Physician: Frank Rubalcava M.D.      Interval History: No acute events overnight.  MRI confirms L PCA stroke with minimal hemorrhagic conversion. TTE without obvious embolic nidus.    Review of systems: In addition to what is detailed in the HPI and/or updated in the interval history, all other systems reviewed and are negative.    Past Medical History, Past Surgical History and Social History reviewed and unchanged from prior    Medications:    Current Facility-Administered Medications:     [START ON 10/16/2023] atorvastatin (Lipitor) tablet 80 mg, 80 mg, Oral, DAILY, Annalisa Ortiz M.D.    aspirin EC tablet 81 mg, 81 mg, Oral, DAILY, Annalisa Ortiz M.D.    acetaminophen (Tylenol) tablet 650 mg, 650 mg, Oral, Q6HRS PRN, Frank Rubalcava M.D.    senna-docusate (Pericolace Or Senokot S) 8.6-50 MG per tablet 2 Tablet, 2 Tablet, Oral, BID **AND** polyethylene glycol/lytes (Miralax) PACKET 1 Packet, 1 Packet, Oral, QDAY PRN **AND** magnesium hydroxide (Milk Of Magnesia) suspension 30 mL, 30 mL, Oral, QDAY PRN **AND** bisacodyl (Dulcolax) suppository 10 mg, 10 mg, Rectal, QDAY PRN, Frank Rubalcava M.D.    Physical Examination:   /67   Pulse 60   Temp 36.5 °C (97.7 °F) (Temporal)   Resp 17   Ht 1.727 m (5' 8\")   Wt 80.8 kg (178 lb 2.1 oz)   SpO2 98%   BMI 27.08 kg/m²       General: Patient is awake and in no acute distress  Neck: There is normal range of motion  CV: Regular rate   Extremities:  Warm, dry, and intact, without peripheral lower extremity edema    NEUROLOGICAL EXAM:     Mental status: Awake, alert and oriented to self and situation.  Speech and language: Speech is clear and fluent. He is often tangential (normal per wife).  The patient is able to name and repeat, and follow commands  Cranial nerve exam: Visual fields are full. There is no nystagmus. Extraocular muscles are intact. Face is symmetric.   Motor " exam: There is sustained antigravity with no downward drift in bilateral arms and legs.   Sensory exam: Reacts to tactile in all 4 extremities, no neglect to double stim   Coordination: No ataxia on bilateral finger-to-nose testing  Gait: Deferred due to patient preference        NIHSS: National Institutes of Health Stroke Scale     [0] 1a:Level of Consciousness           0-alert 1-drowsy   2-stupor   3-coma  [0] 1b:LOC Questions                         0-both  1-one      2-neither  [0] 1c:LOC Commands                       0-both  1-one      2-neither  [0] 2: Best Gaze                      0-nl    1-partial  2-forced  [0] 3: Visual Fields                              0-nl    1-partial  2-complete 3-bilat  [0] 4: Facial Paresis                0-nl    1-minor    2-partial  3-full  MOTOR                                              0-nl  [0] 5: Right Arm                       1-drift  [0] 6: Left Arm                                     2-some effort vs gravity  [0] 7: Right Leg                       3-no effort vs gravity  [0] 8: Left Leg                                      4-no movement                                                              x-untestable  [0] 9: Limb Ataxia                    0-abs   1-1_limb   2-2+_limbs                                                              x-untestable  [0] 10:Sensory                        0-nl    1-partial  2-dense  [0] 11:Best Language/Aphasia         0-nl    1-mild/mod 2-severe   3-mute  [0] 12:Dysarthria                     0-nl    1-mild/mod 2-severe                                                              x-untestable  [0] 13:Neglect/Inattention                   0-none  1-partial  2-complete  [0] TOTAL      Objective Data:    Labs:  Lab Results   Component Value Date/Time    PROTHROMBTM 13.9 10/14/2023 02:07 PM    INR 1.06 10/14/2023 02:07 PM      Lab Results   Component Value Date/Time    WBC 6.6 10/15/2023 02:37 AM    RBC 4.41 (L) 10/15/2023 02:37 AM     HEMOGLOBIN 15.1 10/15/2023 02:37 AM    HEMATOCRIT 43.6 10/15/2023 02:37 AM    MCV 98.9 (H) 10/15/2023 02:37 AM    MCH 34.2 (H) 10/15/2023 02:37 AM    MCHC 34.6 10/15/2023 02:37 AM    MPV 9.5 10/15/2023 02:37 AM    NEUTSPOLYS 50.30 10/15/2023 02:37 AM    LYMPHOCYTES 31.40 10/15/2023 02:37 AM    MONOCYTES 11.20 10/15/2023 02:37 AM    EOSINOPHILS 5.30 10/15/2023 02:37 AM    BASOPHILS 1.50 10/15/2023 02:37 AM      Lab Results   Component Value Date/Time    SODIUM 137 10/15/2023 02:37 AM    POTASSIUM 4.2 10/15/2023 02:37 AM    CHLORIDE 105 10/15/2023 02:37 AM    CO2 22 10/15/2023 02:37 AM    GLUCOSE 116 (H) 10/15/2023 02:37 AM    BUN 17 10/15/2023 02:37 AM    CREATININE 0.98 10/15/2023 02:37 AM      Lab Results   Component Value Date/Time    CHOLSTRLTOT 194 10/15/2023 02:37 AM     (H) 10/15/2023 02:37 AM    HDL 44 10/15/2023 02:37 AM    TRIGLYCERIDE 94 10/15/2023 02:37 AM       Lab Results   Component Value Date/Time    ALKPHOSPHAT 55 10/15/2023 02:37 AM    ASTSGOT 16 10/15/2023 02:37 AM    ALTSGPT 19 10/15/2023 02:37 AM    TBILIRUBIN 0.5 10/15/2023 02:37 AM        Imaging/Testing:    I interpreted and/or reviewed the patient's neuroimaging    MR-BRAIN-W/O   Final Result      1.  Acute to subacute infarcts involving the left medial temporal lobe, left occipital lobe and left thalamus. No evidence of hemorrhagic transformation.   2.  Trace scattered subarachnoid hemorrhage in the left temporo-occipital region.   3.  Mild diffuse cerebral and cerebellar substance loss.   4.  Mild microangiopathic ischemic change versus demyelination or gliosis.      EC-ECHOCARDIOGRAM COMPLETE W/O CONT   Final Result      CT-CTA NECK WITH & W/O-POST PROCESSING   Final Result      No high-grade stenosis, large vessel occlusion, aneurysm or dissection.      CT-CTA HEAD WITH & W/O-POST PROCESS   Final Result      1.  Findings consistent with acute on chronic LEFT temporal occipital infarct with hemorrhagic transformation.  Small amount  of subarachnoid hemorrhage within LEFT occipital sulci as seen on outside CT..   2.  Chronic LEFT thalamic infarct.   3.  No intracranial aneurysm, focal stenosis or abrupt large vessel cut off.          Assessment and Plan:  Enid Plummer is a 62 year old man with brain fog for past 6 days, head CT from OSH suggestive of evolving L PCA infarct in medial temporal lobe, with perhaps associated hemorrhagic conversion.  This was confirmed on MRI brain.  Hemorrhagic conversion is petechial.  Will start ASA, statin for secondary stroke prevention.  Complete embolic workup with ziopatch at discharge.      Problem list:   L PCA stroke  Smoker     Plan:              - q4h neurochecks   - start ASA 81mg daily              - stroke labs:  HgbA1c 5.6 and               - increase atorvastatin to 80mg daily for goal LDL < 70              - long-term BP goal is 110-130/60-80; currently at goal without interventions              - complete embolic workup with ziopatch monitoring at discharge   - smoking cessation counseling              - stroke bridge clinic follow up    The evaluation of the patient, and recommended management, was discussed Dr. Ortiz, resident physician. I have performed a physical exam and reviewed and updated ROS and Plan today (10/15/2023).     Rene Villarreal MD  Neurohospitalist, Acute Care Services

## 2023-10-15 NOTE — PROGRESS NOTES
Monitor Summary: SB/SR 47-79, GA .14, QRS .10, QT .46 with rare PVC&PAC,rare junctional per strip from monitor room

## 2023-10-15 NOTE — PROGRESS NOTES
4 Eyes Skin Assessment Completed by CARLOS Monteiro and CARLOS Rogers.    Head WDL  Ears WDL  Nose WDL  Mouth WDL  Neck WDL  Breast/Chest WDL  Shoulder Blades WDL  Spine WDL  (R) Arm/Elbow/Hand WDL  (L) Arm/Elbow/Hand WDL  Abdomen WDL  Groin WDL  Scrotum/Coccyx/Buttocks WDL  (R) Leg WDL  (L) Leg WDL  (R) Heel/Foot/Toe WDL  (L) Heel/Foot/Toe WDL          Devices In Places Tele Box, Blood Pressure Cuff, and Pulse Ox      Interventions In Place N/A    Possible Skin Injury No    Pictures Uploaded Into Epic N/A  Wound Consult Placed N/A  RN Wound Prevention Protocol Ordered No

## 2023-10-15 NOTE — PROGRESS NOTES
Pt refuses bed alarm, education given regarding stroke and fall risks. Pt acknowledged. Notified charge RN

## 2023-10-15 NOTE — THERAPY
Speech Language Pathology   Clinical Swallow Evaluation     Patient Name: Enid Plummer  AGE:  62 y.o., SEX:  male  Medical Record #: [id number]  Date of Service: 10/15/2023      History of Present Illness  62M admitted on 10/14/23 with headaches, brain fog, and vision changes, with imaging suggestive of L PCA infarct with hemorrhagic conversion. PMH notable for smoking and alcohol use.     Imaging:   10/14/23 CT Head: 5.2 x 2.3 x 1.1 cm of subdural hygroma involving the inferior margin of the left temporal lobe. Moderate subarachnoid hemorrhage involving the left occipital lobe. Etiology is uncertain. There is moderate mass effect throughout the posterior left cerebral hemisphere manifest as effacement of sulcations    MRI brain pending. No chest imaging to review.       General Information:  Vitals  O2 Delivery Device: None - Room Air  Level of Consciousness: Alert  Patient Behaviors: Restless  Orientation: Oriented x 4  Follows Directives: Yes      Prior Living Situation & Level of Function:  Communication: WFL  Swallowing: WFL       Oral Mechanism Evaluation:  Dentition: Good, Natural dentition   Facial Symmetry: Equal  Facial Sensation: Equal     Labial Observations: WFL   Lingual Observations: Midline  Motor Speech: WFL         Laryngeal Function:  Secretion Management: Adequate  Voice Quality: WFL  Cough: Perceptually WNL       Subjective  Patient reports no dysphagia/SLP hx and consuming a regular diet at baseline. RN reports no difficulty with meals. Patient restless throughout. 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      Assessment  Current Method of Nutrition: Oral diet (RG7/TN0)  Positioning: Sitting EOB  Bolus Administration: Patient    O2 Delivery Device: None - Room Air  Factor(s) Affecting Performance: None  Tracheostomy : No      Swallowing Trials:  Thin Liquid (TN0): WFL  Liquidised (LQ3): WFL  Regular (RG7): WFL      Comments: Adequate oral  bolus acceptance/containment. Complete AP transfer without notable oral bolus residue upon oral inspection. Adequate bite with functional mastication of solids. No cough/throat clear appreciated with PO. Vocal quality remained stable throughout PO intake. Single swallow completed per bolus. No signs of esophageal dysfunction. Patient adequately self-feeding with appropriate rate and volume of intake. Provided education regarding general aspiration precautions as well as signs of aspiration. All questions addressed.        Clinical Impressions  Patient presents with clinically functional oropharyngeal swallow. Acute dysphagia intervention is not warranted at this time.      Recommendations  Diet Consistency: Regular solids with thin liquids  Instrumentation: None indicated at this time  Medication: As tolerated  Supervision: Independent  Positioning: Fully upright and midline during oral intake  Risk Management : None  Oral Care: BID         SLP Treatment Plan  Treatment Plan: None Indicated  SLP Frequency: N/A - Evaluation Only  Estimated Duration: N/A - Evaluation Only      Anticipated Discharge Needs  Discharge Recommendations:  (TBD pending cognitive evaluation)                      Torie Saeed, SLP

## 2023-10-15 NOTE — THERAPY
Speech Language Pathology   Cognitive Evaluation     Patient Name: Enid Plummer  AGE:  62 y.o., SEX:  male  Medical Record #: [id number]  Date of Service: 10/15/2023      History of Present Illness  62M admitted on 10/14/23 with headaches, brain fog, and vision changes, with imaging suggestive of L PCA infarct with hemorrhagic conversion. PMH notable for smoking and alcohol use.      Imaging:   10/14/23 CT Head: 5.2 x 2.3 x 1.1 cm of subdural hygroma involving the inferior margin of the left temporal lobe. Moderate subarachnoid hemorrhage involving the left occipital lobe. Etiology is uncertain. There is moderate mass effect throughout the posterior left cerebral hemisphere manifest as effacement of sulcations     MRI brain pending. No chest imaging to review.       General Information  Vitals  O2 Delivery Device: None - Room Air  Level of Consciousness: Alert  Patient Behaviors: Restless  Orientation: Oriented x 4  Follows Directives: Yes      Prior Living Situation & Level of Function  Communication: WFL  Swallowing: [facility]       Oral Mechanism Evaluation  Dentition: Good, Natural dentition, Missing posterior dentition  Facial Symmetry: Equal  Facial Sensation: Equal  Labial Observations: WFL  Lingual Observations: Midline  Motor Speech: WFL      Laryngeal Function Exam  Secretion Management: Adequate  Voice Quality: WFL  Cough: Perceptually WNL      Subjective  Patient restless throughout. 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 Also works in [employer].      Assessment  The patient was seen this date for a cognitive evaluation. Portions of the COGNISTAT (Neurobehavioral Cognitive Status Assessment) were administered in conjunction with non-standardized assessments. Results are outlined below:         Orientation: Average  Attention: Average  Comprehension: Average  Repetition: Average  Naming: Average  Memory: Severe  Calculations: Average  Similarities: Average  Judgement:  "Average    Comments: Immediate recall of four word memory task achieved on first attempt. With delayed recall of ~5 minutes, patient recalled 2/4 words with three word list; 2/4 not recalled.        Medication Management:  Provided patient with medication instructions. Patient read instructions/questions aloud and answered dosage, timing, and memory strategy questions accurately and promptly.       Clock Drawing:  Prompt: \"Draw a clock, put all the numbers inside the [place], and place the hands at ten after eleven.\" Normative data indicates an average score of 34/37. Patient scored 35/37, endorsing above findings. Points deducted for single missing number and false start at [place].     Patient requested bed alarm be turned off. SLP did so for while present. RN notified and entered room upon SLP exit to discuss with patient.       Clinical Impressions  Patient presents with cognitive impairment in memory, which he reports is a baseline deficit. All other cognitive domains are in within normal limits. Patient demonstrates good awareness for deficit, use of functional memory during tasks, and provides adequate external memory aids during tasks, thus acute cognitive intervention is not indicated. Patient respectfully declined referral for outpatient cognitive services. Service to sign off.       NOTE: It is not within the scope of practice of Speech-Language Pathologists to determine patient capacity. Please defer to the physician or psych to complete this assessment.       Recommendations  Supervision Needs Upons Discharge: Intermittent assistance with IADLs (see below)  IADLs: Medication management         SLP Treatment Plan  Treatment Plan: None Indicated  SLP Frequency: N/A - Evaluation Only  Estimated Duration: N/A - Evaluation Only      Anticipated Discharge Needs  Discharge Recommendations: Anticipate that the patient will have no further speech therapy needs after discharge from the hospital  Therapy " Recommendations Upon DC: Not Indicated                Torie Saeed, SLP

## 2023-10-15 NOTE — ASSESSMENT & PLAN NOTE
Due to acute on chronic stroke.    Neurochecks every 4 hours  Monitor closely  MRI of the brain without contrast

## 2023-10-15 NOTE — ASSESSMENT & PLAN NOTE
Continues to smoke half a pack per day.    I counseled the patient for 4 minutes regarding smoking cessation using methods such as nicotine replacement therapy with patches and gum and medications such as Wellbutrin.  I also discussed with him breathing techniques and meditation as well as regular physical activity, which will assist him with smoking cessation.  Patient Is not interested in quitting at this time.

## 2023-10-15 NOTE — CONSULTS
Neurology Initial Consult H&P  Neurohospitalist Service, [facility]    Referring Physician: Frank Rubalcava M.D.    Chief Complaint   Patient presents with    Sent by MD     Patient sent from [facility] for a hygroma and spontaneous SAH, patient reports feeling groggy and off on Tuesday, symptoms did not resolve, sought medical treatment today. -stroke scale,        HPI: Enid Plummer is a 62 year old man transferred from [facility] for stroke evaluation.  Enid is a long-time smoker, but otherwise does not have known vascular risk factors.  He reports that starting Tuesday morning, he's been having headaches, brain fog, and vision changes from his R eye.  At first he attributed to drinking a bit more on Monday night.  But this sense of brain fog persisted, and he went to ER for evaluation.  He denied any focal weakness, language difficulties.  At [facility], a head CT revealed an evolving hypodensity in the L medial temporal lobe.  In addition, there appears to be curvilinear hyperdensities in adjacent sulci most consistent with subarachnoid hemorrhage.  On arrival to [facility], patient has a non-focal exam.  His BP was in the 120s. On ROS, he denies any infectious prodrome or constitutional symptoms.    Review of systems: In addition to what is detailed in the HPI above, all other systems reviewed and are negative.    Past Medical History:    has a past medical history of Acute stroke due to ischemia ([facility]) (10/14/2023), Continuous tobacco abuse (7/27/2017), FH: celiac disease (9/13/2017), and Poor dentition (7/27/2017).    He has no past medical history of Addisons disease ([facility]), Allergy, Anemia, Anxiety, Arrhythmia, Arthritis, Asthma, Blood transfusion without reported diagnosis, Cancer ([facility]), Cataract, CHF (congestive heart failure) ([facility]), Clotting disorder ([facility]), Cushings syndrome ([facility]), Depression, Diabetes ([facility]), Diabetic neuropathy ([facility]), GERD (gastroesophageal reflux  "disease), Glaucoma, Goiter, Heart attack (HCC), Heart murmur, HIV (human immunodeficiency virus infection) (HCC), Hyperlipidemia, Hypertension, IBD (inflammatory bowel disease), Kidney disease, Meningitis, Migraine, Muscle disorder, Osteoporosis, Parathyroid disorder (HCC), Pituitary disease (HCC), Pulmonary emphysema (HCC), Seizure (HCC), Sickle cell disease (HCC), Tuberculosis, Ulcer, or Urinary tract infection, site not specified.    FHx:  family history includes Diabetes in his daughter; Parkinson's Disease in his mother.    SHx:   reports that he has been smoking cigarettes. He has a 17.5 pack-year smoking history. He has never used smokeless tobacco. He reports current alcohol use of about 10.5 oz of alcohol per week. He reports that he does not use drugs.    Allergies:  Allergies   Allergen Reactions    Amoxicillin-Pot Clavulanate Rash     > 20 years ago       Medications:    Current Facility-Administered Medications:     acetaminophen (Tylenol) tablet 650 mg, 650 mg, Oral, Q6HRS PRN, Frank Rubalcava M.D.    senna-docusate (Pericolace Or Senokot S) 8.6-50 MG per tablet 2 Tablet, 2 Tablet, Oral, BID **AND** polyethylene glycol/lytes (Miralax) PACKET 1 Packet, 1 Packet, Oral, QDAY PRN **AND** magnesium hydroxide (Milk Of Magnesia) suspension 30 mL, 30 mL, Oral, QDAY PRN **AND** bisacodyl (Dulcolax) suppository 10 mg, 10 mg, Rectal, QDAY PRN, Frank Rubalcava M.D.  No current outpatient medications on file.    Physical Examination:     General: Patient is awake and in no acute distress  Eye: Examination of optic disks not indicated at this time given acuity of consult  Neck: There is normal range of motion  CV: regular rate   Extremities:  clear, dry, intact, without peripheral edema    NEUROLOGICAL EXAM:     BP (!) 142/64   Pulse 63   Temp 37.1 °C (98.8 °F) (Temporal)   Resp 20   Ht 1.727 m (5' 8\")   Wt 81.6 kg (180 lb)   SpO2 95%   BMI 27.37 kg/m²       Mental status: Awake, alert and oriented to self and " situation.  Speech and language: Speech is clear and fluent. He is often tangential (normal per wife).  The patient is able to name and repeat, and follow commands  Cranial nerve exam: Visual fields are full. There is no nystagmus. Extraocular muscles are intact. Face is symmetric.   Motor exam: There is sustained antigravity with no downward drift in bilateral arms and legs.   Sensory exam: Reacts to tactile in all 4 extremities, no neglect to double stim   Coordination: No ataxia on bilateral finger-to-nose testing  Gait: Deferred due to patient preference      NIHSS: National Institutes of Health Stroke Scale    [0] 1a:Level of Consciousness    0-alert 1-drowsy   2-stupor   3-coma  [0] 1b:LOC Questions                  0-both  1-one      2-neither  [0] 1c:LOC Commands                   0-both  1-one      2-neither  [0] 2: Best Gaze                     0-nl    1-partial  2-forced  [0] 3: Visual Fields                   0-nl    1-partial  2-complete 3-bilat  [0] 4: Facial Paresis                0-nl    1-minor    2-partial  3-full  MOTOR                       0-nl  [0] 5: Right Arm           1-drift  [0] 6: Left Arm             2-some effort vs gravity  [0] 7: Right Leg           3-no effort vs gravity  [0] 8: Left Leg             4-no movement                             x-untestable  [0] 9: Limb Ataxia                    0-abs   1-1_limb   2-2+_limbs       x-untestable  [0] 10:Sensory                        0-nl    1-partial  2-dense  [0] 11:Best Language/Aphasia         0-nl    1-mild/mod 2-severe   3-mute  [0] 12:Dysarthria                     0-nl    1-mild/mod 2-severe       x-untestable  [0] 13:Neglect/Inattention            0-none  1-partial  2-complete  [0] TOTAL      Objective Data:    Labs:  Lab Results   Component Value Date/Time    PROTHROMBTM 13.9 10/14/2023 02:07 PM    INR 1.06 10/14/2023 02:07 PM      Lab Results   Component Value Date/Time    WBC 6.1 10/14/2023 02:07 PM    RBC 4.96 10/14/2023 02:07  PM    HEMOGLOBIN 17.2 10/14/2023 02:07 PM    HEMATOCRIT 48.0 10/14/2023 02:07 PM    MCV 96.8 10/14/2023 02:07 PM    MCH 34.7 (H) 10/14/2023 02:07 PM    MCHC 35.8 10/14/2023 02:07 PM    MPV 9.3 10/14/2023 02:07 PM    NEUTSPOLYS 53.20 10/14/2023 02:07 PM    LYMPHOCYTES 31.30 10/14/2023 02:07 PM    MONOCYTES 10.70 10/14/2023 02:07 PM    EOSINOPHILS 3.30 10/14/2023 02:07 PM    BASOPHILS 1.30 10/14/2023 02:07 PM      Lab Results   Component Value Date/Time    SODIUM 138 10/14/2023 02:07 PM    POTASSIUM 4.2 10/14/2023 02:07 PM    CHLORIDE 107 10/14/2023 02:07 PM    CO2 19 (L) 10/14/2023 02:07 PM    GLUCOSE 95 10/14/2023 02:07 PM    BUN 11 10/14/2023 02:07 PM    CREATININE 0.81 10/14/2023 02:07 PM      Lab Results   Component Value Date/Time    CHOLSTRLTOT 251 (H) 04/29/2020 10:40 AM     (H) 04/29/2020 10:40 AM    HDL 61 (H) 04/29/2020 10:40 AM    TRIGLYCERIDE 66 04/29/2020 10:40 AM       Lab Results   Component Value Date/Time    ALKPHOSPHAT 55 10/14/2023 10:50 AM    ASTSGOT 35 10/14/2023 10:50 AM    ALTSGPT 32 10/14/2023 10:50 AM    TBILIRUBIN 1.2 10/14/2023 10:50 AM        Imaging/Testing:    I interpreted and/or reviewed the patient's neuroimaging    CT-CTA HEAD WITH & W/O-POST PROCESS    (Results Pending)   CT-CTA NECK WITH & W/O-POST PROCESSING    (Results Pending)   MR-BRAIN-W/O    (Results Pending)       Assessment and Plan:  Enid Plummer is a 62 year old man with brain fog for past 5 days, head CT from OSH suggestive of evolving L PCA infarct in medial temporal lobe, with perhaps associated hemorrhagic conversion.  Will attain MRI brain to further delineate.  Initiate stroke evaluation with vessel imaging, embolic workup with TTE and cardiac monitoring.  Will hold on antithrombotic therapy until potential hemorrhage is further characterize on MRI.    Problem list:   L PCA stroke  Smoker    Plan:   - admit to Neuroscience, q4h neurochecks/NIHSS   - attain CT angiogram of head and neck   - expedite MRI  brain without contrast   - no antithrombotic therapy for now, SCDs only   - stroke labs:  HgbA1c and lipid panel   - start atorvastatin 40mg daily for goal LDL < 70   - long-term BP goal is 110-130/60-80; currently at goal without interventions   - complete embolic workup with TTE and ziopatch monitoring at discharge   - PT/OT/SLP    The evaluation of the patient, and recommended management, was discussed with Dr. Stephenson, ER attending and Dr. Rubalcava, hospitalist attending.    Rene Villarreal MD  Vascular Neurology

## 2023-10-15 NOTE — CARE PLAN
The patient is Stable - Low risk of patient condition declining or worsening    Shift Goals  Clinical Goals: neuro monitoring and safety  Patient Goals: go home  Family Goals: JAMES    Progress made toward(s) clinical / shift goals:    Problem: Knowledge Deficit - Stroke Education  Goal: Patient's knowledge of stroke and risk factors will improve  Outcome: Progressing  Note: Patient educated on relevant risk factors for stroke such as HTN, dyslipidemia, and substance abuse and how to mitigate those risk factors.      Problem: Neuro Status  Goal: Neuro status will remain stable or improve  Outcome: Progressing  Note: Q 4 neuro checks and NIH stroke scale in use. Patient A&O x 4, full sensation intact. NIH of 0.        Patient is not progressing towards the following goals: N/A

## 2023-10-15 NOTE — HOSPITAL COURSE
Mental fogginess.     Mr. Plummer is a 61yo male patient w/o significant PMHx, current smoker, transferred from [facility] for evaluation for stroke. He initially presented with sxs of mental fogginess, headaches, blurry vision that started 4 days prior to admission, followed by sudden onset of severe headache associated with nausea and vomiting that prompted him to go to the ED. A CT head done at [facility] was concerning for hygroma with spontaneous SAH. Dr. Villarreal from neurology was consulted, per his read of the CT, patient seemed to have a left PCA stroke involving left medial temporal lobe and concerns for mild hemorrhagic transformation.     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 FHx notable for stroke in father.     Lipid panel shows , Hg dropped from 17.2 to 15.1. CBC and CMP otherwise normal.  Hemoglobin A1c 5.6.  Started on statin CTA head and neck on 10/14/2023 showed acute on chronic left temporal occipital infarct with hemorrhagic transformation, small amount of SAH within left occipital sulci, chronic left thalamic infarct.  No other high-grade stenosis, large vessel occlusion, aneurysm or dissection noted.    MRI brain and echo pending.  Started on atorvastatin 40 mg once daily.  Hold off on antiplatelets due to concerns for hemorrhagic transformation.  He will need Zio patch monitoring at discharge for embolic stroke work-up.    Mr. Plummer is a 61yo male patient w/o significant PMHx, current smoker, transferred from [facility] for evaluation for stroke. He has evidence of acute on chronic left temporal occipital infarct with mild hemorrhagic transformation. Awaiting Echo and MRI brain.

## 2023-10-15 NOTE — DISCHARGE INSTRUCTIONS
"Special Equipment    You are being discharged with the following special equipment:  Not Applicable    Diet    Resume your normal diet as tolerated.  A diet low in cholesterol, fat, and sodium is recommended for good health.     Activity    Resume Your Normal Activity    You may resume your normal activity as tolerated.  Rest as needed.      Ischemic Stroke  Discharge Instructions    You experienced an Ischemic Stroke.  Ischemic stroke is the most common type of stroke and happens when an artery in the brain becomes blocked by a plaque fragment or blood clot. Typically, these blockages travel from the heart or larger arteries that supply the brain.  The brain needs a constant supply of blood, which carries oxygen and nutrients it needs to function.  A stroke occurs when one of these arteries to the brain is either blocked or bursts. As a result, part of the brain does not get the blood it needs, so it starts to die.       Stroke Risk Factors    You are at increased risk of having another stroke event.  See your Patient Stroke Guide to help reduce your stroke risk. These are your specific risk factors:  Age - Over 55  Alcohol or Drug Abuse  Gender - Men are at a higher risk than women  High Cholesterol and lipids  Inactivity or Overweight / High BMI  Not taking your medications as prescribed  Previous TIAs or \"mini strokes\"  Smoking or Tobacco Use     Get help right away if you have any signs of a stroke.  \"BE FAST\" is an easy way to remember the main warning signs of a stroke:  B - Balance. Dizziness, sudden trouble walking, or loss of balance.  E - Eyes. Trouble seeing or a change in how you see.  F - Face. Sudden weakness or loss of feeling in the face. The face or eyelid may droop on one side.  A - Arms. Weakness or loss of feeling in an arm. This happens all of a sudden and most often on one side of the body.  S - Speech. Sudden trouble speaking, slurred speech, or trouble understanding what people say.  T - " Time. Time to call emergency services. Write down what time symptoms started.

## 2023-10-15 NOTE — ASSESSMENT & PLAN NOTE
Mild subarachnoid hemorrhage likely due to hemorrhagic transformation of acute on chronic left PCA stroke.    Avoid anticoagulation or antiplatelet therapy for now  MRI of the brain pending  Neurochecks every 4 hours

## 2023-10-16 ENCOUNTER — NON-PROVIDER VISIT (OUTPATIENT)
Dept: CARDIOLOGY | Facility: MEDICAL CENTER | Age: 63
End: 2023-10-16

## 2023-10-16 ENCOUNTER — PATIENT OUTREACH (OUTPATIENT)
Dept: SCHEDULING | Facility: IMAGING CENTER | Age: 63
End: 2023-10-16
Payer: COMMERCIAL

## 2023-10-16 DIAGNOSIS — I47.29 NSVT (NONSUSTAINED VENTRICULAR TACHYCARDIA) (HCC): ICD-10-CM

## 2023-10-16 DIAGNOSIS — I49.1 APC (ATRIAL PREMATURE CONTRACTIONS): ICD-10-CM

## 2023-10-16 DIAGNOSIS — I49.8 VENTRICULAR TRIGEMINY: ICD-10-CM

## 2023-10-16 DIAGNOSIS — I49.3 PVC'S (PREMATURE VENTRICULAR CONTRACTIONS): ICD-10-CM

## 2023-10-16 DIAGNOSIS — I63.9 CEREBRAL INFARCTION, UNSPECIFIED MECHANISM (HCC): ICD-10-CM

## 2023-10-16 DIAGNOSIS — I47.10 SVT (SUPRAVENTRICULAR TACHYCARDIA) (HCC): ICD-10-CM

## 2023-10-16 DIAGNOSIS — I49.8 VENTRICULAR BIGEMINY: ICD-10-CM

## 2023-10-17 NOTE — DISCHARGE SUMMARY
[facility] Discharge Summary    Attending: Dr. Frank Rubalcava MD  Senior Resident: Dr. Annalisa Ortiz MD  Contact Number: [phone]    CHIEF COMPLAINT ON ADMISSION  Chief Complaint   Patient presents with    Sent by MD     Patient sent from [facility] for a hygroma and spontaneous SAH, patient reports feeling groggy and off on Tuesday, symptoms did not resolve, sought medical treatment today. -stroke scale,        Reason for Admission  EMS     Admission Date  10/14/2023    CODE STATUS  Full code    HPI & HOSPITAL COURSE  This is a 62 y.o. male here with Mental fogginess.     Mr. Plummer is a 63yo male patient w/o significant PMHx, current smoker, transferred from [facility] for evaluation for stroke. He initially presented with sxs of mental fogginess, headaches, blurry vision that started 4 days prior to admission, followed by sudden onset of severe headache associated with nausea and vomiting that prompted him to go to the ED. A CT head done at [facility] was concerning for hygroma with spontaneous SAH. Dr. Villarreal from neurology was consulted, per his read of the CT, patient seemed to have a left PCA stroke involving left medial temporal lobe and concerns for mild hemorrhagic transformation.     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 FHx notable for stroke in father.     Lipid panel shows , goal <70 hence started on high intensity statin this admission. Hemoglobin A1c 5.6.      CTA head and neck on 10/14/2023 showed acute on chronic left temporal occipital infarct with hemorrhagic transformation, small amount of SAH within left occipital sulci, chronic left thalamic infarct.  No other high-grade stenosis, large vessel occlusion, aneurysm or dissection noted.    Echo 10/15/2023 showed normal LVEF 60% without any other concerning  structural abnormalities.    An MRI brain without contrast was done on 10/15/2023.  That showed acute to subacute infarcts involving the left medial temporal lobe, left occipital lobe, and left thalamus.  There is no evidence of hemorrhagic transformation.  Trace scattered SAH in the left temporal occipital region.    Per Dr. Villarreal from neurology, pt was started on low dose ASA after completion of MRI brain in addition to the high intensity statin. A zio patch was placed prior to dc to monitor for any arrhythmias.    He will need close f/u in the stroke bridge clinic and with a PCP. Both referrals placed.     Therefore, he is discharged in good and stable condition to home with close outpatient follow-up.    The patient recovered much more quickly than anticipated on admission.    Discharge Date  10/15/2023    Physical Exam on Day of Discharge  Physical Exam  Vitals and nursing note reviewed. Exam conducted with a chaperone present.   Constitutional:       General: He is not in acute distress.     Appearance: Normal appearance. He is not ill-appearing or diaphoretic.   HENT:      Head: Normocephalic and atraumatic.      Mouth/Throat:      Mouth: Mucous membranes are moist.   Eyes:      General: No scleral icterus.     Extraocular Movements: Extraocular movements intact.      Conjunctiva/sclera: Conjunctivae normal.      Pupils: Pupils are equal, round, and reactive to light.   Cardiovascular:      Rate and Rhythm: Normal rate and regular rhythm.      Pulses: Normal pulses.      Heart sounds: Normal heart sounds. No murmur heard.     No gallop.   Pulmonary:      Effort: Pulmonary effort is normal. No respiratory distress.      Breath sounds: Normal breath sounds. No wheezing or rhonchi.   Abdominal:      General: Abdomen is flat. Bowel sounds are normal. There is no distension.      Palpations: Abdomen is soft. There is no mass.      Tenderness: There is no abdominal tenderness.   Musculoskeletal:         General:  Normal range of motion.      Cervical back: Normal range of motion and neck supple. No tenderness.   Skin:     General: Skin is warm.   Neurological:      General: No focal deficit present.      Mental Status: He is alert and oriented to person, place, and time.      Cranial Nerves: Cranial nerves 2-12 are intact.      Sensory: Sensation is intact.      Motor: Motor function is intact.      Coordination: Coordination is intact.      Gait: Gait is intact.      Deep Tendon Reflexes: Reflexes are normal and symmetric. Reflexes normal.      Comments: Has some short term amnesia         FOLLOW UP ITEMS POST DISCHARGE  PCP and stroke bridge clinic    DISCHARGE DIAGNOSES  Principal Problem:    Acute ischemic left PCA stroke (HCC) (POA: Yes)  Active Problems:    Cerebral edema (HCC) (POA: Yes)    Alcohol use disorder, mild, abuse (POA: Yes)    Smoking greater than 30 pack years (POA: Yes)    Subarachnoid hemorrhage (HCC) (POA: Yes)  Resolved Problems:    * No resolved hospital problems. *      FOLLOW UP  No future appointments.  [facility]  [address] [place] [id number] [phone]  Call in 1 week(s)  stroke bridge clinic    NEUROLOGY AND NEURODIAGNOSTIC  [address] [place] [phone]        PCP    Call in 2 week(s)      Pcp Pt States None            MEDICATIONS ON DISCHARGE     Medication List        START taking these medications        Instructions   Aspirin Low Dose 81 MG EC tablet  Generic drug: aspirin   Take 1 Tablet by mouth every day for 90 days.  Dose: 81 mg     atorvastatin 80 MG tablet  Commonly known as: Lipitor   Take 1 Tablet by mouth every day for 90 days.  Dose: 80 mg              Allergies  Allergies   Allergen Reactions    Amoxicillin-Pot Clavulanate Rash     > 20 years ago       DIET  Cardiac    ACTIVITY  As tolerated.  Weight bearing as tolerated    CONSULTATIONS  Neurology    PROCEDURES  None    LABORATORY  Lab Results   Component Value Date     SODIUM 137 10/15/2023    POTASSIUM 4.2 10/15/2023    CHLORIDE 105 10/15/2023    CO2 22 10/15/2023    GLUCOSE 116 (H) 10/15/2023    BUN 17 10/15/2023    CREATININE 0.98 10/15/2023        Lab Results   Component Value Date    WBC 6.6 10/15/2023    HEMOGLOBIN 15.1 10/15/2023    HEMATOCRIT 43.6 10/15/2023    PLATELETCT 239 10/15/2023        Total time of the discharge process exceeds 43 minutes.

## 2023-10-17 NOTE — DOCUMENTATION QUERY
[facility]                                                                       Query Response Note      PATIENT:               THU SANCHEZ  ACCT #:                  [id number]  MRN:                     [id number]  :                      1960  ADMIT DATE:       10/14/2023 1:58 PM  DISCH DATE:        10/15/2023 2:35 PM  RESPONDING  PROVIDER #:        [id number]           QUERY TEXT:    Please provide additional clinical indicators supportive of your documented diagnosis of cerebral edema    Note: If an appropriate response is not listed below, please respond with a new note.              The patient's Clinical Indicators include:  Cerebral edema noted in H/P  Cerebral edema not noted in CT scan or MRI  Options provided:   -- Condition of cerebral edema exists   -- Condition of cerebral edema does not exist and amended documentation provided in the medical record   -- Unable to provide additional clarity regarding the diagnosis      Query created by: Radha Loredo on 10/17/2023 10:51 AM    RESPONSE TEXT:    Condition of cerebral edema exists          Electronically signed by:  JEAN BOUCHER MD 10/17/2023 2:11 PM

## 2023-10-18 ENCOUNTER — TELEPHONE (OUTPATIENT)
Dept: HEALTH INFORMATION MANAGEMENT | Facility: OTHER | Age: 63
End: 2023-10-18

## 2023-10-26 SDOH — HEALTH STABILITY: PHYSICAL HEALTH: ON AVERAGE, HOW MANY MINUTES DO YOU ENGAGE IN EXERCISE AT THIS LEVEL?: 150+ MIN

## 2023-10-26 SDOH — ECONOMIC STABILITY: INCOME INSECURITY: IN THE LAST 12 MONTHS, WAS THERE A TIME WHEN YOU WERE NOT ABLE TO PAY THE MORTGAGE OR RENT ON TIME?: NO

## 2023-10-26 SDOH — ECONOMIC STABILITY: HOUSING INSECURITY
IN THE LAST 12 MONTHS, WAS THERE A TIME WHEN YOU DID NOT HAVE A STEADY PLACE TO SLEEP OR SLEPT IN A SHELTER (INCLUDING NOW)?: NO

## 2023-10-26 SDOH — HEALTH STABILITY: PHYSICAL HEALTH: ON AVERAGE, HOW MANY DAYS PER WEEK DO YOU ENGAGE IN MODERATE TO STRENUOUS EXERCISE (LIKE A BRISK WALK)?: 5 DAYS

## 2023-10-26 SDOH — ECONOMIC STABILITY: FOOD INSECURITY: WITHIN THE PAST 12 MONTHS, THE FOOD YOU BOUGHT JUST DIDN'T LAST AND YOU DIDN'T HAVE MONEY TO GET MORE.: NEVER TRUE

## 2023-10-26 SDOH — ECONOMIC STABILITY: HOUSING INSECURITY: IN THE LAST 12 MONTHS, HOW MANY PLACES HAVE YOU LIVED?: 1

## 2023-10-26 SDOH — ECONOMIC STABILITY: TRANSPORTATION INSECURITY
IN THE PAST 12 MONTHS, HAS THE LACK OF TRANSPORTATION KEPT YOU FROM MEDICAL APPOINTMENTS OR FROM GETTING MEDICATIONS?: NO

## 2023-10-26 SDOH — ECONOMIC STABILITY: TRANSPORTATION INSECURITY
IN THE PAST 12 MONTHS, HAS LACK OF RELIABLE TRANSPORTATION KEPT YOU FROM MEDICAL APPOINTMENTS, MEETINGS, WORK OR FROM GETTING THINGS NEEDED FOR DAILY LIVING?: NO

## 2023-10-26 SDOH — ECONOMIC STABILITY: FOOD INSECURITY: WITHIN THE PAST 12 MONTHS, YOU WORRIED THAT YOUR FOOD WOULD RUN OUT BEFORE YOU GOT MONEY TO BUY MORE.: NEVER TRUE

## 2023-10-26 SDOH — ECONOMIC STABILITY: TRANSPORTATION INSECURITY
IN THE PAST 12 MONTHS, HAS LACK OF TRANSPORTATION KEPT YOU FROM MEETINGS, WORK, OR FROM GETTING THINGS NEEDED FOR DAILY LIVING?: NO

## 2023-10-26 SDOH — HEALTH STABILITY: MENTAL HEALTH
STRESS IS WHEN SOMEONE FEELS TENSE, NERVOUS, ANXIOUS, OR CAN'T SLEEP AT NIGHT BECAUSE THEIR MIND IS TROUBLED. HOW STRESSED ARE YOU?: TO SOME EXTENT

## 2023-10-26 SDOH — ECONOMIC STABILITY: INCOME INSECURITY: HOW HARD IS IT FOR YOU TO PAY FOR THE VERY BASICS LIKE FOOD, HOUSING, MEDICAL CARE, AND HEATING?: NOT HARD AT ALL

## 2023-10-26 ASSESSMENT — LIFESTYLE VARIABLES
SKIP TO QUESTIONS 9-10: 1
HOW MANY STANDARD DRINKS CONTAINING ALCOHOL DO YOU HAVE ON A TYPICAL DAY: 1 OR 2
AUDIT-C TOTAL SCORE: 4
HOW OFTEN DO YOU HAVE SIX OR MORE DRINKS ON ONE OCCASION: NEVER
HOW OFTEN DO YOU HAVE A DRINK CONTAINING ALCOHOL: 4 OR MORE TIMES A WEEK

## 2023-10-26 ASSESSMENT — SOCIAL DETERMINANTS OF HEALTH (SDOH)
HOW OFTEN DO YOU HAVE A DRINK CONTAINING ALCOHOL: 4 OR MORE TIMES A WEEK
DO YOU BELONG TO ANY CLUBS OR ORGANIZATIONS SUCH AS CHURCH GROUPS UNIONS, FRATERNAL OR ATHLETIC GROUPS, OR SCHOOL GROUPS?: NO
HOW OFTEN DO YOU HAVE SIX OR MORE DRINKS ON ONE OCCASION: NEVER
DO YOU BELONG TO ANY CLUBS OR ORGANIZATIONS SUCH AS CHURCH GROUPS UNIONS, FRATERNAL OR ATHLETIC GROUPS, OR SCHOOL GROUPS?: NO
IN A TYPICAL WEEK, HOW MANY TIMES DO YOU TALK ON THE PHONE WITH FAMILY, FRIENDS, OR NEIGHBORS?: ONCE A WEEK
HOW OFTEN DO YOU ATTEND CHURCH OR RELIGIOUS SERVICES?: NEVER
HOW OFTEN DO YOU ATTEND CHURCH OR RELIGIOUS SERVICES?: NEVER
HOW OFTEN DO YOU ATTENT MEETINGS OF THE CLUB OR ORGANIZATION YOU BELONG TO?: NEVER
IN A TYPICAL WEEK, HOW MANY TIMES DO YOU TALK ON THE PHONE WITH FAMILY, FRIENDS, OR NEIGHBORS?: ONCE A WEEK
HOW OFTEN DO YOU GET TOGETHER WITH FRIENDS OR RELATIVES?: ONCE A WEEK
WITHIN THE PAST 12 MONTHS, YOU WORRIED THAT YOUR FOOD WOULD RUN OUT BEFORE YOU GOT THE MONEY TO BUY MORE: NEVER TRUE
HOW MANY DRINKS CONTAINING ALCOHOL DO YOU HAVE ON A TYPICAL DAY WHEN YOU ARE DRINKING: 1 OR 2
HOW OFTEN DO YOU ATTENT MEETINGS OF THE CLUB OR ORGANIZATION YOU BELONG TO?: NEVER
HOW HARD IS IT FOR YOU TO PAY FOR THE VERY BASICS LIKE FOOD, HOUSING, MEDICAL CARE, AND HEATING?: NOT HARD AT ALL
HOW OFTEN DO YOU GET TOGETHER WITH FRIENDS OR RELATIVES?: ONCE A WEEK

## 2023-10-30 ENCOUNTER — OFFICE VISIT (OUTPATIENT)
Dept: MEDICAL GROUP | Facility: LAB | Age: 63
End: 2023-10-30
Payer: COMMERCIAL

## 2023-10-30 VITALS
RESPIRATION RATE: 14 BRPM | BODY MASS INDEX: 27.07 KG/M2 | TEMPERATURE: 96.8 F | HEART RATE: 50 BPM | SYSTOLIC BLOOD PRESSURE: 116 MMHG | OXYGEN SATURATION: 95 % | WEIGHT: 178.6 LBS | HEIGHT: 68 IN | DIASTOLIC BLOOD PRESSURE: 58 MMHG

## 2023-10-30 DIAGNOSIS — Z09 HOSPITAL DISCHARGE FOLLOW-UP: ICD-10-CM

## 2023-10-30 DIAGNOSIS — I60.9 SUBARACHNOID HEMORRHAGE (HCC): ICD-10-CM

## 2023-10-30 DIAGNOSIS — Z87.891 FORMER SMOKER: ICD-10-CM

## 2023-10-30 PROBLEM — M16.0 OSTEOARTHRITIS OF BOTH HIPS: Status: ACTIVE | Noted: 2018-04-01

## 2023-10-30 PROBLEM — M16.11 ARTHRITIS OF RIGHT HIP: Status: ACTIVE | Noted: 2020-05-26

## 2023-10-30 PROCEDURE — 99204 OFFICE O/P NEW MOD 45 MIN: CPT | Performed by: NURSE PRACTITIONER

## 2023-10-30 PROCEDURE — 3078F DIAST BP <80 MM HG: CPT | Performed by: NURSE PRACTITIONER

## 2023-10-30 PROCEDURE — 3074F SYST BP LT 130 MM HG: CPT | Performed by: NURSE PRACTITIONER

## 2023-10-30 ASSESSMENT — PATIENT HEALTH QUESTIONNAIRE - PHQ9: CLINICAL INTERPRETATION OF PHQ2 SCORE: 0

## 2023-10-30 ASSESSMENT — FIBROSIS 4 INDEX: FIB4 SCORE: 0.94

## 2023-10-30 NOTE — PROGRESS NOTES
"Subjective:     CC:    Chief Complaint   Patient presents with    Establish [facility]    Hospital Follow-up     HISTORY OF THE PRESENT ILLNESS: Patient is a 62 y.o. male. This pleasant patient is here today to establish care and discuss the following:    Hospital follow up  Per ER 10/14/2023: \"Mr. Plummer is a 63yo male patient w/o significant PMHx, current smoker, transferred from [facility] for evaluation for stroke. He initially presented with sxs of mental fogginess, headaches, blurry vision that started 4 days prior to admission, followed by sudden onset of severe headache associated with nausea and vomiting that prompted him to go to the ED. A CT head done at [facility] was concerning for hygroma with spontaneous SAH. Dr. Villarreal from neurology was consulted, per his read of the CT, patient seemed to have a left PCA stroke involving left medial temporal lobe and concerns for mild hemorrhagic transformation.      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 FHx notable for stroke in father.      Lipid panel shows , goal <70 hence started on high intensity statin this admission. Hemoglobin A1c 5.6.       CTA head and neck on 10/14/2023 showed acute on chronic left temporal occipital infarct with hemorrhagic transformation, small amount of SAH within left occipital sulci, chronic left thalamic infarct.  No other high-grade stenosis, large vessel occlusion, aneurysm or dissection noted.     Echo 10/15/2023 showed normal LVEF 60% without any other concerning structural abnormalities.     An MRI brain without contrast was done on 10/15/2023.  That showed acute to subacute infarcts involving the left medial temporal lobe, left occipital lobe, and left thalamus.  There is no evidence of hemorrhagic transformation.  Trace scattered SAH in the left temporal occipital " "region.     Per Dr. Villarreal from neurology, pt was started on low dose ASA after completion of MRI brain in addition to the high intensity statin. A zio patch was placed prior to dc to monitor for any arrhythmias.\"     Reports that he does not feel like his \"mind is sound\". Feels like his brain is not functioning correctly. Is having some memory issues. Denies hemiparesis, aphasia, apraxia, hemianopia, or neglect.  He has not been able to schedule a follow up with neurology.    Has been having a really hard time not smoking. Was previously smoking about 0.5 ppd, but stopped after hospitalization 2 weeks ago.  Has been taking medication as prescribed. Reports that he has been having some GI upset which he thinks is due to medication, but this is tolerable.    Patient lives in [facility] and expressed that he will be finding a PCP closer to home.    ROS:   Gen: no fevers/chills, no changes in weight  Eyes: no changes in vision  ENT: no sore throat, no hearing loss, no bloody nose  Pulm: no sob, no cough  CV: no chest pain, no palpitations  GI: no nausea/vomiting, no diarrhea  : no dysuria  MSk: no myalgias  Skin: no rash  Neuro: no headaches, no numbness/tingling  Heme/Lymph: no easy bruising        - NOTE: All other systems reviewed and are negative, except as in HPI.      Objective:     Exam: /58 (BP Location: Right arm, Patient Position: Sitting, BP Cuff Size: Adult)   Pulse (!) 50   Temp 36 °C (96.8 °F)   Resp 14   Ht 1.727 m (5' 8\")   Wt 81 kg (178 lb 9.6 oz)   SpO2 95%  Body mass index is 27.16 kg/m².    Constitutional: Alert, no distress, well-groomed.  Skin: Warm, dry, good turgor, no rashes in visible areas.  Eye: Equal, round and reactive, conjunctiva clear, lids normal.  ENMT: Lips without lesions, good dentition, moist mucous membranes.  Neck: Trachea midline, no masses, no thyromegaly.  Respiratory: Unlabored respiratory effort, no cough.  MSK: Normal gait, moves all extremities.  Neuro: CN " II-XII intact, strength 5/5 in all muscle groups, sensation intact bilaterally, coordination intact bilaterally  Psych: Alert and oriented x3, normal affect and mood.    Assessment & Plan:   62 y.o. male with the following -    1. Hospital discharge follow-up  Reviewed the patient's hospitalization in depth including imaging, lab work and discharge summary. Answered all questions.    2. Subarachnoid hemorrhage (HCC)  Patient will be calling to schedule an appointment with neurology. Denies acute symptoms. BP controlled. Continue medications as prescribed. Strict ER precautions advised.    3. Former smoker  Discussed various products available to assist with this endeavor including nicotine replacement patch, gum, Chantix, Wellbutrin, support groups and therapy. Emphasized the importance of identifying and eliminating triggers. Medical and social consequences of continued tobacco use discussed.

## 2023-11-06 ENCOUNTER — TELEPHONE (OUTPATIENT)
Dept: CARDIOLOGY | Facility: MEDICAL CENTER | Age: 63
End: 2023-11-06
Payer: COMMERCIAL

## 2023-11-07 ENCOUNTER — OFFICE VISIT (OUTPATIENT)
Dept: NEUROLOGY | Facility: MEDICAL CENTER | Age: 63
End: 2023-11-07
Attending: PSYCHIATRY & NEUROLOGY
Payer: COMMERCIAL

## 2023-11-07 VITALS
BODY MASS INDEX: 26.97 KG/M2 | SYSTOLIC BLOOD PRESSURE: 116 MMHG | HEIGHT: 69 IN | TEMPERATURE: 97.7 F | OXYGEN SATURATION: 96 % | DIASTOLIC BLOOD PRESSURE: 56 MMHG | WEIGHT: 182.1 LBS | HEART RATE: 70 BPM

## 2023-11-07 DIAGNOSIS — Z86.73 H/O ISCHEMIC LEFT PCA STROKE: ICD-10-CM

## 2023-11-07 PROBLEM — I63.532 ACUTE ISCHEMIC LEFT PCA STROKE (HCC): Status: RESOLVED | Noted: 2023-10-14 | Resolved: 2023-11-07

## 2023-11-07 PROCEDURE — 3074F SYST BP LT 130 MM HG: CPT | Performed by: PSYCHIATRY & NEUROLOGY

## 2023-11-07 PROCEDURE — 3078F DIAST BP <80 MM HG: CPT | Performed by: PSYCHIATRY & NEUROLOGY

## 2023-11-07 PROCEDURE — 99204 OFFICE O/P NEW MOD 45 MIN: CPT | Performed by: PSYCHIATRY & NEUROLOGY

## 2023-11-07 PROCEDURE — 93228 REMOTE 30 DAY ECG REV/REPORT: CPT | Performed by: STUDENT IN AN ORGANIZED HEALTH CARE EDUCATION/TRAINING PROGRAM

## 2023-11-07 PROCEDURE — 99211 OFF/OP EST MAY X REQ PHY/QHP: CPT | Performed by: PSYCHIATRY & NEUROLOGY

## 2023-11-07 RX ORDER — ATORVASTATIN CALCIUM 40 MG/1
40 TABLET, FILM COATED ORAL DAILY
Qty: 90 TABLET | Refills: 2 | Status: SHIPPED | OUTPATIENT
Start: 2023-11-07 | End: 2024-01-31 | Stop reason: SDUPTHER

## 2023-11-07 ASSESSMENT — FIBROSIS 4 INDEX: FIB4 SCORE: 0.94

## 2023-11-07 NOTE — PROGRESS NOTES
Chief Complaint   Patient presents with    New Patient     Stroke Bridge       History of present illness:  Enid Plummer 62 y.o. male long-time smoker presents to stroke bridge clinic. This patient presented with brain fog and R vision loss on 10/14/23, found to have L medial temporal lobe infarct.   He continues to feel foggy-headed. He used to think of many projects going on but now has trouble remembering names.   He has quit smoking and previously was a 35-year smoker. He is using nicotine gum.   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         Past medical history:   Past Medical History:   Diagnosis Date    Acute stroke due to ischemia (HCC) 10/14/2023    Continuous tobacco abuse 7/27/2017    FH: celiac disease 9/13/2017    Osteoarthritis of both hips 4/1/2018    Poor dentition 7/27/2017       Past surgical history:   Past Surgical History:   Procedure Laterality Date    COLONOSCOPY - ENDO  10/31/2017    Procedure: COLONOSCOPY - ENDO;  Surgeon: Erasto Romano M.D.;  Location: SURGERY [facility];  Service: Gastroenterology    SHOULDER ARTHROSCOPY W/ SLAP / LABRAL REPAIR  5/14/2013    Performed by Rodolfo Srivastava M.D. at SURGERY [facility]    OTHER ORTHOPEDIC SURGERY         Family history:   Family History   Problem Relation Age of Onset    Parkinson's Disease Mother     Diabetes Daughter        Social history:   Social History     Socioeconomic History    Marital status:      Spouse name: Not on file    Number of children: Not on file    Years of education: Not on file    Highest education level: 12th grade   Occupational History    Not on file   Tobacco Use    Smoking status: Every Day     Current packs/day: 0.50     Average packs/day: 0.5 packs/day for 35.0 years (17.5 ttl pk-yrs)     Types: Cigarettes    Smokeless tobacco: Never   Vaping Use    Vaping Use: Never used   Substance and Sexual Activity    Alcohol use: Yes     Alcohol/week: 10.5 oz      Types: 21 Standard drinks or equivalent per week    Drug use: No    Sexual activity: Yes     Partners: Female   Other Topics Concern    Not on file   Social History Narrative    Not on file     Social Determinants of Health     Financial Resource Strain: Low Risk  (10/26/2023)    Overall Financial Resource Strain (CARDIA)     Difficulty of Paying Living Expenses: Not hard at all   Food Insecurity: No Food Insecurity (10/26/2023)    Hunger Vital Sign     Worried About Running Out of Food in the Last Year: Never true     Ran Out of Food in the Last Year: Never true   Transportation Needs: No Transportation Needs (10/26/2023)    PRAPARE - Transportation     Lack of Transportation (Medical): No     Lack of Transportation (Non-Medical): No   Physical Activity: Sufficiently Active (10/26/2023)    Exercise Vital Sign     Days of Exercise per Week: 5 days     Minutes of Exercise per Session: 150+ min   Stress: Stress Concern Present (10/26/2023)    [employer] [place] [employer] - Occupational Stress Questionnaire     Feeling of Stress : To some extent   Social Connections: Socially Isolated (10/26/2023)    Social Connection and Isolation Panel [NHANES]     Frequency of Communication with Friends and Family: Once a week     Frequency of Social Gatherings with Friends and Family: Once a week     Attends Congregational Services: Never     Active Member of Clubs or Organizations: No     Attends Club or Organization Meetings: Never     Marital Status:    Intimate Partner Violence: Not on file   Housing Stability: Low Risk  (10/26/2023)    Housing Stability Vital Sign     Unable to Pay for Housing in the Last Year: No     Number of Places Lived in the Last Year: 1     Unstable Housing in the Last Year: No       Current medications:   Current Outpatient Medications   Medication    aspirin 81 MG EC tablet    atorvastatin (LIPITOR) 80 MG tablet     No current facility-administered medications for this visit.        Medication Allergy:  Allergies   Allergen Reactions    Amoxicillin-Pot Clavulanate Rash     > 20 years ago       Physical examination:     Current NIHSS    1a. LOC: 0  1b. LOC Questions: 0  1c. LOC Commands: 0  2. Best Gaze:0  3. Visual Fields: 0  4. Facial Paresis: 0  5a. Motor arm left: 0  5b. Motor arm right: 0  6a. Motor leg left: 0  6b. Motor leg right: 0  7. Sensory: 0  8. Best Language: 0  9. Limb Ataxia: 0  10. Dysarthria: 0  11. Extinction/Inattention: 0    Total Score: 0        Current mRS 0    Labs:  I reviewed the following labs personally:  Lab Results   Component Value Date/Time    HBA1C 5.6 10/14/2023 02:07 PM      Lab Results   Component Value Date/Time    CHOLSTRLTOT 194 10/15/2023 0237    TRIGLYCERIDE 94 10/15/2023 0237    HDL 44 10/15/2023 0237     (H) 10/15/2023 0237    CHOLHDLRAT 4.11 2020 1040    NONHDL 190 (H) 2020 1040       Imagin-day cardiac event monitor: negative for A-fib    Transthoracic  Echo Report        Echocardiography Laboratory     CONCLUSIONS  No prior study is available for comparison.   Normal LV size and systolic function with estimated LVEF 60%  Normal RV size and systolic function  No significant valvular abnormalities per doppler assessment  There is evidence of early right to left shunt suggestive of   intracardiac shunt (ASD or PFO).  Normal IVC size  No pericardial effusion    CT-CTA NECK WITH & W/O-POST PROCESSING    FINDINGS:  Aortic arch: conventional branching pattern.     Right common carotid artery: Patent.     Right internal carotid artery: Patent, without significant plaque or stenosis.     Left common carotid artery: Patent.     Left internal carotid artery: Patent, without significant plaque or stenosis.     The right vertebral artery is patent without dissection or stenosis.     The left vertebral artery is patent without dissection or stenosis.     Vertebrobasilar confluence: Normal.     Lung apices are clear.     The neck  soft tissues are unremarkable.     3D angiographic/MIP images of the vasculature confirm the vascular findings as described above.     IMPRESSION:     No high-grade stenosis, large vessel occlusion, aneurysm or dissection.    MR-BRAIN-W/O  I reviewed the images personally and agree with the following read:     IMPRESSION:     1.  Acute to subacute infarcts involving the left medial temporal lobe, left occipital lobe and left thalamus. No evidence of hemorrhagic transformation.  2.  Trace scattered subarachnoid hemorrhage in the left temporo-occipital region.  3.  Mild diffuse cerebral and cerebellar substance loss.  4.  Mild microangiopathic ischemic change versus demyelination or gliosis.    ASSESSMENT AND PLAN:  Problem List Items Addressed This Visit       H/O ischemic left PCA stroke       1. H/O ischemic left PCA stroke      Presumed Mechanism by TOAST  __  Large-artery atherosclerosis  __  Cardioembolism  __  Small-vessel occlusion  __  Stroke of other determined etiology   _x_  Stroke of undetermined etiology     Antithrombotic: ASA 81mg daily   Blood pressure goal: < 140/90  LDL goal: < 100 (repeat lipid panel in 2 months)     I have counseled the patient on smoking cessation and he has quit smoking.   Due to the absence of atherosclerotic change of the intracranial vasculature, I have referred him to cardiology for implantable loop recorder.  The has a left PCA occlusion that most likely is embolic in origin.  He is a long-term smoker, however there is no atherosclerotic change in his intracranial vasculature.  He will remain on aspirin 81 mg as his antithrombotic.  He will recheck his lipid panel in 2 months.  I have provided the patient with documents on diet and exercise.    FOLLOW-UP:   No follow-ups on file.    Total time spent for the day for this patient unrelated to procedure time is: 39 minutes. I spent 30 minutes in face to face time and I spent 4 minutes pre-charting and 5 minutes in post-visit  documentation.      Dr. Tarik Ramirez D.O.  [facility]

## 2023-11-07 NOTE — PATIENT INSTRUCTIONS
Continue ASPIRIN 81mg daily for stroke prevention   Your goal blood pressure is < 140/90   Your goal LDL cholesterol is < 100 - recheck your lipid panel in 2 months.   Engage in moderate exercise 3-4 times weekly.   I have referred you to CARDIOLOGY for a loop recorder.

## 2024-01-31 DIAGNOSIS — Z86.73 H/O ISCHEMIC LEFT PCA STROKE: ICD-10-CM

## 2024-01-31 RX ORDER — ATORVASTATIN CALCIUM 40 MG/1
40 TABLET, FILM COATED ORAL DAILY
Qty: 90 TABLET | Refills: 2 | Status: SHIPPED | OUTPATIENT
Start: 2024-01-31 | End: 2024-10-27

## 2024-01-31 NOTE — TELEPHONE ENCOUNTER
Received request via: Pharmacy    Medication Name/Dosage Atorvastatin 40 MG    When was medication last prescribed 11.07.2023    How many refills were previously provided 2    How many Refills does he patient have left from last prescription 0    Was the patient seen in the last year in this department? Yes   Date of last office visit 11.7.2023     Per last Neurology Office Visit, when was the date of next follow up visit set for?                            Date of office visit follow up request -Return if symptoms worsen or fail to improve.        Does the patient have an upcoming appointment? No   If yes, when-Return if symptoms worsen or fail to improve.                 If no, schedule appointment N/A    Does the patient have USP Plus and need 100 day supply (blood pressure, diabetes and cholesterol meds only)? Patient does not have SCP